# Patient Record
Sex: FEMALE | Race: WHITE | HISPANIC OR LATINO | URBAN - METROPOLITAN AREA
[De-identification: names, ages, dates, MRNs, and addresses within clinical notes are randomized per-mention and may not be internally consistent; named-entity substitution may affect disease eponyms.]

---

## 2023-10-06 ENCOUNTER — APPOINTMENT (OUTPATIENT)
Dept: LAB | Age: 38
End: 2023-10-06
Payer: COMMERCIAL

## 2023-10-06 DIAGNOSIS — Z11.59 SCREENING EXAMINATION FOR POLIOMYELITIS: ICD-10-CM

## 2023-10-06 LAB — HCV AB SER QL: NORMAL

## 2023-10-06 PROCEDURE — 86803 HEPATITIS C AB TEST: CPT

## 2023-10-06 PROCEDURE — 36415 COLL VENOUS BLD VENIPUNCTURE: CPT

## 2024-02-27 ENCOUNTER — ANNUAL EXAM (OUTPATIENT)
Dept: OBGYN CLINIC | Facility: CLINIC | Age: 39
End: 2024-02-27
Payer: COMMERCIAL

## 2024-02-27 VITALS
HEIGHT: 66 IN | BODY MASS INDEX: 29.89 KG/M2 | DIASTOLIC BLOOD PRESSURE: 72 MMHG | SYSTOLIC BLOOD PRESSURE: 110 MMHG | WEIGHT: 186 LBS

## 2024-02-27 DIAGNOSIS — N92.0 MENORRHAGIA WITH REGULAR CYCLE: Primary | ICD-10-CM

## 2024-02-27 DIAGNOSIS — Z01.419 WOMEN'S ANNUAL ROUTINE GYNECOLOGICAL EXAMINATION: ICD-10-CM

## 2024-02-27 PROCEDURE — G0476 HPV COMBO ASSAY CA SCREEN: HCPCS | Performed by: OBSTETRICS & GYNECOLOGY

## 2024-02-27 PROCEDURE — G0145 SCR C/V CYTO,THINLAYER,RESCR: HCPCS | Performed by: OBSTETRICS & GYNECOLOGY

## 2024-02-27 PROCEDURE — S0610 ANNUAL GYNECOLOGICAL EXAMINA: HCPCS | Performed by: OBSTETRICS & GYNECOLOGY

## 2024-02-27 RX ORDER — TRANEXAMIC ACID 650 MG/1
TABLET ORAL
Qty: 30 TABLET | Refills: 3 | Status: SHIPPED | OUTPATIENT
Start: 2024-02-27

## 2024-02-27 RX ORDER — METHYLPHENIDATE HYDROCHLORIDE 36 MG/1
TABLET, EXTENDED RELEASE ORAL
COMMUNITY
Start: 2024-02-06

## 2024-02-27 RX ORDER — TRAZODONE HYDROCHLORIDE 50 MG/1
25 TABLET ORAL
COMMUNITY
Start: 2024-01-02 | End: 2024-06-30

## 2024-02-27 NOTE — PROGRESS NOTES
Assessment/Plan:    Patient was informed of a stable GYN examination.  We had a discussion about the menorrhagia with regular cycles.  Her present time she would like to try the TXA/Lysteda.  Prescription was sent.  She will take 2 tablets 3 times a day for total of 5 days beginning on the first day of the menstrual cycle.  She will return to my office in 10 weeks for reevaluation she will keep a menstrual calendar.  She is aware the first cycle might not be much improved.  She is content with her weight.  She feels safe at home.  She sees a dentist on a regular basis.  Denies any prior depression or anxiety does have a history of ADHD which is under control with Concerta.  She should return to my office in 10 weeks.          Subjective:      Patient ID: Natalya Mercado is a 38 y.o. female.    HPI    This is a 38-year-old female, she is a  1 para 1  section approximately 8 years ago.  Her current method of contraception includes withdrawal and rhythm.  We had a discussion about other methods of contraception including vasectomy.  Her menstrual cycles are regular and predictable.  Her bleeding is very heavy and passing of clots.  Her bleeding pattern approximately 5 days.  She states her bleeding pattern is sometimes interfering with her lifestyle.  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  There is no problem with intimacy.  She feels safe at home.  She sees a dentist on a regular basis.  She is content with her weight.  Denies any prior depression or anxiety.  She does have a history of ADHD is on Concerta.  We had a discussion about how to control her heavy menstrual cycles including birth control pills, IUD, or TXA/list that.  She has decided to go with the third option at the present time.  There are no absolute contraindications.  Family history is noncontributory her mother is alive her father is .  The patient's  section 8 years ago was complicated by  "preeclampsia.  She is sure she is not considering any more pregnancies.  She will need a Pap smear today.  She is not a smoker.        The following portions of the patient's history were reviewed and updated as appropriate: allergies, current medications, past family history, past medical history, past social history, past surgical history, and problem list.    Review of Systems   Genitourinary:         Menorrhagia with regular cycles   All other systems reviewed and are negative.        Objective:      /72   Ht 5' 6\" (1.676 m)   Wt 84.4 kg (186 lb)   LMP 2024 (Exact Date)   BMI 30.02 kg/m²          Physical Exam  Vitals reviewed. Exam conducted with a chaperone present.   Constitutional:       Appearance: Normal appearance. She is normal weight.   HENT:      Head: Normocephalic and atraumatic.      Nose: Nose normal.   Eyes:      Extraocular Movements: Extraocular movements intact.      Pupils: Pupils are equal, round, and reactive to light.   Cardiovascular:      Rate and Rhythm: Normal rate and regular rhythm.   Pulmonary:      Effort: Pulmonary effort is normal.      Breath sounds: Normal breath sounds.   Chest:   Breasts:     Breasts are symmetrical.      Right: Normal.      Left: Normal.   Abdominal:      General: Abdomen is flat. A surgical scar is present. Bowel sounds are normal.      Palpations: Abdomen is soft. There is no hepatomegaly or splenomegaly.      Hernia: No hernia is present.          Comments: Pfannenstiel skin incision for  well-healed x 1   Genitourinary:     General: Normal vulva.      Pubic Area: No rash or pubic lice.       Labia:         Right: No rash, tenderness, lesion or injury.         Left: No rash, tenderness, lesion or injury.       Urethra: No prolapse, urethral pain, urethral swelling or urethral lesion.      Vagina: Normal. No signs of injury and foreign body. No vaginal discharge, erythema, tenderness, bleeding, lesions or prolapsed vaginal walls.    "   Cervix: Normal.      Uterus: Normal.       Adnexa: Right adnexa normal and left adnexa normal.      Rectum: Normal.      Comments: The external genitalia within normal limits the vagina is clean the uterus is retroverted normal size.  There is no cervical motion tenderness.  A Pap smear was performed.  There is no evidence of prolapse.  The adnexa is clear bilaterally.  Musculoskeletal:         General: Normal range of motion.      Cervical back: Normal range of motion and neck supple.   Lymphadenopathy:      Upper Body:      Right upper body: No supraclavicular adenopathy.      Left upper body: No supraclavicular adenopathy.   Skin:     General: Skin is warm and dry.   Neurological:      General: No focal deficit present.      Mental Status: She is alert and oriented to person, place, and time.   Psychiatric:         Mood and Affect: Mood normal.         Behavior: Behavior normal.

## 2024-02-27 NOTE — PATIENT INSTRUCTIONS
The patient was informed of a stable GYN examination.  The uterus is retroverted but normal size.  We had a discussion about menorrhagia and irregular cycles.  We had a discussion about using birth control pills, IUD or medication.  She is prefers to try the medication called TXA/Lysteda.  A prescription was sent.  She will take 2 tablets 3 times a day beginning on the first day of the menstrual cycle for total of 5 days.  She should return to my office in 10 weeks for reevaluation she will keep a menstrual calendar.

## 2024-02-28 LAB
HPV HR 12 DNA CVX QL NAA+PROBE: NEGATIVE
HPV16 DNA CVX QL NAA+PROBE: NEGATIVE
HPV18 DNA CVX QL NAA+PROBE: NEGATIVE

## 2024-03-04 LAB
LAB AP GYN PRIMARY INTERPRETATION: NORMAL
Lab: NORMAL

## 2024-04-16 ENCOUNTER — NURSE TRIAGE (OUTPATIENT)
Age: 39
End: 2024-04-16

## 2024-04-16 NOTE — TELEPHONE ENCOUNTER
Regarding: pos pregnancy test unsure lmp  ----- Message from Morris Thompson sent at 4/16/2024  2:24 PM EDT -----  Patient took positive pregnancy test today lmp 3/6 but only lasted 2 days. Last intimate with  in February that period was 2/7. Patient seen by Dr. Toth is aware he does not do OB she was going to look into which office she wanted to be scheduled in.

## 2024-04-16 NOTE — TELEPHONE ENCOUNTER
"Incoming call from patient to schedule appointment with provider. Patient states last true LMP 2/7/24. Patient states she had 2 days of bleeding 3/6/24, but doesn't believe that was her period. States she was taking TXA so she is unsure. Positive home pregnancy test today. Scheduled for earliest D/V appointment 5/6. Advised will reach ou to office staff to see if they would like patient to be seen sooner vs if they have any available earlier. Patient verbalized understanding. No further questions at this time.     Reason for Disposition   Information only question and nurse able to answer    Answer Assessment - Initial Assessment Questions  1. REASON FOR CALL or QUESTION: \"What is your reason for calling today?\" or \"How can I best help you?\" or \"What question do you have that I can help answer?\"      Schedule D/V appointment    Protocols used: Information Only Call - No Triage-ADULT-[place]    "

## 2024-04-19 NOTE — TELEPHONE ENCOUNTER
Called Pt to offer sooner apt 4/25. Pt declined due that she has to work, nothing else available sooner will keep looking for cancellations. Pt only available after 2pm, or Tuesdays and Fridays.

## 2024-04-23 ENCOUNTER — TELEPHONE (OUTPATIENT)
Facility: HOSPITAL | Age: 39
End: 2024-04-23

## 2024-04-23 ENCOUNTER — ULTRASOUND (OUTPATIENT)
Dept: OBGYN CLINIC | Facility: CLINIC | Age: 39
End: 2024-04-23
Payer: COMMERCIAL

## 2024-04-23 VITALS
WEIGHT: 167 LBS | SYSTOLIC BLOOD PRESSURE: 106 MMHG | HEIGHT: 66 IN | BODY MASS INDEX: 26.84 KG/M2 | DIASTOLIC BLOOD PRESSURE: 68 MMHG

## 2024-04-23 DIAGNOSIS — N91.2 AMENORRHEA: Primary | ICD-10-CM

## 2024-04-23 DIAGNOSIS — Z34.90 EARLY STAGE OF PREGNANCY: ICD-10-CM

## 2024-04-23 PROCEDURE — 76817 TRANSVAGINAL US OBSTETRIC: CPT | Performed by: PHYSICIAN ASSISTANT

## 2024-04-23 PROCEDURE — 99213 OFFICE O/P EST LOW 20 MIN: CPT | Performed by: PHYSICIAN ASSISTANT

## 2024-04-23 NOTE — TELEPHONE ENCOUNTER
Called patient to schedule MFM appointment, based on referral issued to Maternal Fetal Medicine by OB office.    Left voicemail requesting patient to call back and schedule appointment, with office number for return call [phone].

## 2024-04-23 NOTE — PROGRESS NOTES
"S: 38 y.o.  who presents for viability scan with LMP of 24. She is 10 weeks and 4 days by her LMP. She endorses some vaginal spotting last month in Feb when she was due  for her period w no recurrence since. This is not a planned and welcomed pregnancy. Her previous pregnancies was complicated by preE. Previously delivered via  due to arrest of dilation.      History reviewed. No pertinent past medical history.    OB History    Para Term  AB Living   2 1 1     1   SAB IAB Ectopic Multiple Live Births           1      # Outcome Date GA Lbr Julio/2nd Weight Sex Delivery Anes PTL Lv   2 Current            1 Term 16 39w4d  3275 g (7 lb 3.5 oz) F CS-LTranv EPI N BAUDILIO      Complications: Failure to Progress in Second Stage, Severe pre-eclampsia        O:  Vitals:    24 0954   BP: 106/68   BP Location: Left arm   Patient Position: Sitting   Cuff Size: Standard   Weight: 75.8 kg (167 lb)   Height: 5' 6\" (1.676 m)       TVUS: viable, roca IUP at 7 weeks 5 days with CRL 14mm. FHT 168bpm. CLARIBEL 24. Final dating via US. S<D by approx 3 weeks will adjust CLARIBEL by US.                      A/P:  #1. IUP at 7 weeks and 5 days  - Viable pregnancy on TVUS  - RTC in 1 week for nurse intake visit  -Slip printed for initial labs including carrier screenings and preE baseline labs. She will plan HNL for her blood work    Problem List Items Addressed This Visit    None  Visit Diagnoses       Amenorrhea    -  Primary    Relevant Orders    [facility] US OB < 14 weeks single or first gestation level 1    Early stage of pregnancy        Relevant Orders    Ambulatory Referral to Maternal Fetal Medicine    CBC and differential    Hepatitis B surface antigen    Hepatitis C antibody    Hgb Fractionation Cascade    HIV 1/2 AG/AB w Reflex [facility] for 2 yr old and above    Rubella antibody, IgG    RPR-Syphilis Screening (Total Syphilis IGG/IGM)    Type and screen    Urinalysis with microscopic    Urine " culture    Varicella zoster antibody, IgG    Spinal muscular atrophy DNA    Cystic fibrosis gene test    Protein / creatinine ratio, urine    Comprehensive metabolic panel            Valentine Vaughan PA-C  OB/GYN  4/23/2024  10:39 AM

## 2024-04-25 ENCOUNTER — TELEPHONE (OUTPATIENT)
Age: 39
End: 2024-04-25

## 2024-04-26 ENCOUNTER — APPOINTMENT (OUTPATIENT)
Dept: LAB | Facility: HOSPITAL | Age: 39
End: 2024-04-26
Payer: COMMERCIAL

## 2024-04-26 DIAGNOSIS — Z34.90 EARLY STAGE OF PREGNANCY: ICD-10-CM

## 2024-04-26 LAB
ABO GROUP BLD: NORMAL
ALBUMIN SERPL BCP-MCNC: 4.4 G/DL (ref 3.5–5)
ALP SERPL-CCNC: 36 U/L (ref 34–104)
ALT SERPL W P-5'-P-CCNC: 13 U/L (ref 7–52)
ANION GAP SERPL CALCULATED.3IONS-SCNC: 9 MMOL/L (ref 4–13)
AST SERPL W P-5'-P-CCNC: 15 U/L (ref 13–39)
BACTERIA UR QL AUTO: NORMAL /HPF
BASOPHILS # BLD AUTO: 0.03 THOUSANDS/ÂΜL (ref 0–0.1)
BASOPHILS NFR BLD AUTO: 1 % (ref 0–1)
BILIRUB SERPL-MCNC: 0.46 MG/DL (ref 0.2–1)
BILIRUB UR QL STRIP: NEGATIVE
BLD GP AB SCN SERPL QL: NEGATIVE
BUN SERPL-MCNC: 9 MG/DL (ref 5–25)
CALCIUM SERPL-MCNC: 9.4 MG/DL (ref 8.4–10.2)
CHLORIDE SERPL-SCNC: 102 MMOL/L (ref 96–108)
CLARITY UR: CLEAR
CO2 SERPL-SCNC: 25 MMOL/L (ref 21–32)
COLOR UR: YELLOW
CREAT SERPL-MCNC: 0.52 MG/DL (ref 0.6–1.3)
CREAT UR-MCNC: 107.1 MG/DL
EOSINOPHIL # BLD AUTO: 0.05 THOUSAND/ÂΜL (ref 0–0.61)
EOSINOPHIL NFR BLD AUTO: 1 % (ref 0–6)
ERYTHROCYTE [DISTWIDTH] IN BLOOD BY AUTOMATED COUNT: 12.9 % (ref 11.6–15.1)
GFR SERPL CREATININE-BSD FRML MDRD: 122 ML/MIN/1.73SQ M
GLUCOSE P FAST SERPL-MCNC: 88 MG/DL (ref 65–99)
GLUCOSE UR STRIP-MCNC: NEGATIVE MG/DL
HBV SURFACE AG SER QL: NORMAL
HCT VFR BLD AUTO: 34.8 % (ref 34.8–46.1)
HCV AB SER QL: NORMAL
HGB BLD-MCNC: 11.5 G/DL (ref 11.5–15.4)
HGB UR QL STRIP.AUTO: NEGATIVE
HIV 1+2 AB+HIV1 P24 AG SERPL QL IA: NORMAL
HIV 2 AB SERPL QL IA: NORMAL
HIV1 AB SERPL QL IA: NORMAL
HIV1 P24 AG SERPL QL IA: NORMAL
IMM GRANULOCYTES # BLD AUTO: 0.01 THOUSAND/UL (ref 0–0.2)
IMM GRANULOCYTES NFR BLD AUTO: 0 % (ref 0–2)
KETONES UR STRIP-MCNC: NEGATIVE MG/DL
LEUKOCYTE ESTERASE UR QL STRIP: NEGATIVE
LYMPHOCYTES # BLD AUTO: 1.67 THOUSANDS/ÂΜL (ref 0.6–4.47)
LYMPHOCYTES NFR BLD AUTO: 31 % (ref 14–44)
MCH RBC QN AUTO: 30.1 PG (ref 26.8–34.3)
MCHC RBC AUTO-ENTMCNC: 33 G/DL (ref 31.4–37.4)
MCV RBC AUTO: 91 FL (ref 82–98)
MONOCYTES # BLD AUTO: 0.4 THOUSAND/ÂΜL (ref 0.17–1.22)
MONOCYTES NFR BLD AUTO: 7 % (ref 4–12)
NEUTROPHILS # BLD AUTO: 3.28 THOUSANDS/ÂΜL (ref 1.85–7.62)
NEUTS SEG NFR BLD AUTO: 60 % (ref 43–75)
NITRITE UR QL STRIP: NEGATIVE
NON-SQ EPI CELLS URNS QL MICRO: NORMAL /HPF
NRBC BLD AUTO-RTO: 0 /100 WBCS
PH UR STRIP.AUTO: 7.5 [PH]
PLATELET # BLD AUTO: 277 THOUSANDS/UL (ref 149–390)
PMV BLD AUTO: 10.5 FL (ref 8.9–12.7)
POTASSIUM SERPL-SCNC: 3.6 MMOL/L (ref 3.5–5.3)
PROT SERPL-MCNC: 7.2 G/DL (ref 6.4–8.4)
PROT UR STRIP-MCNC: NEGATIVE MG/DL
PROT UR-MCNC: 7 MG/DL
PROT/CREAT UR: 0.07 MG/G{CREAT} (ref 0–0.1)
RBC # BLD AUTO: 3.82 MILLION/UL (ref 3.81–5.12)
RBC #/AREA URNS AUTO: NORMAL /HPF
RH BLD: POSITIVE
RUBV IGG SERPL IA-ACNC: 33.8 IU/ML
SODIUM SERPL-SCNC: 136 MMOL/L (ref 135–147)
SP GR UR STRIP.AUTO: 1.01 (ref 1–1.03)
SPECIMEN EXPIRATION DATE: NORMAL
TREPONEMA PALLIDUM IGG+IGM AB [PRESENCE] IN SERUM OR PLASMA BY IMMUNOASSAY: NORMAL
UROBILINOGEN UR QL STRIP.AUTO: 0.2 E.U./DL
VZV IGG SER QL IA: ABNORMAL
WBC # BLD AUTO: 5.44 THOUSAND/UL (ref 4.31–10.16)
WBC #/AREA URNS AUTO: NORMAL /HPF

## 2024-04-26 PROCEDURE — 81220 CFTR GENE COM VARIANTS: CPT

## 2024-04-26 PROCEDURE — 86762 RUBELLA ANTIBODY: CPT

## 2024-04-26 PROCEDURE — 86850 RBC ANTIBODY SCREEN: CPT

## 2024-04-26 PROCEDURE — 87389 HIV-1 AG W/HIV-1&-2 AB AG IA: CPT

## 2024-04-26 PROCEDURE — 83020 HEMOGLOBIN ELECTROPHORESIS: CPT

## 2024-04-26 PROCEDURE — 84156 ASSAY OF PROTEIN URINE: CPT

## 2024-04-26 PROCEDURE — 87340 HEPATITIS B SURFACE AG IA: CPT

## 2024-04-26 PROCEDURE — 85025 COMPLETE CBC W/AUTO DIFF WBC: CPT

## 2024-04-26 PROCEDURE — 86901 BLOOD TYPING SEROLOGIC RH(D): CPT

## 2024-04-26 PROCEDURE — 86803 HEPATITIS C AB TEST: CPT

## 2024-04-26 PROCEDURE — 81001 URINALYSIS AUTO W/SCOPE: CPT

## 2024-04-26 PROCEDURE — 81329 SMN1 GENE DOS/DELETION ALYS: CPT

## 2024-04-26 PROCEDURE — 36415 COLL VENOUS BLD VENIPUNCTURE: CPT

## 2024-04-26 PROCEDURE — 87086 URINE CULTURE/COLONY COUNT: CPT

## 2024-04-26 PROCEDURE — 80053 COMPREHEN METABOLIC PANEL: CPT

## 2024-04-26 PROCEDURE — 86780 TREPONEMA PALLIDUM: CPT

## 2024-04-26 PROCEDURE — 86900 BLOOD TYPING SEROLOGIC ABO: CPT

## 2024-04-26 PROCEDURE — 86787 VARICELLA-ZOSTER ANTIBODY: CPT

## 2024-04-26 PROCEDURE — 82570 ASSAY OF URINE CREATININE: CPT

## 2024-04-27 LAB — BACTERIA UR CULT: NORMAL

## 2024-04-30 ENCOUNTER — INITIAL PRENATAL (OUTPATIENT)
Dept: OBGYN CLINIC | Facility: CLINIC | Age: 39
End: 2024-04-30

## 2024-04-30 VITALS
BODY MASS INDEX: 27 KG/M2 | DIASTOLIC BLOOD PRESSURE: 70 MMHG | HEIGHT: 66 IN | WEIGHT: 168 LBS | SYSTOLIC BLOOD PRESSURE: 110 MMHG

## 2024-04-30 DIAGNOSIS — Z34.82 ENCOUNTER FOR SUPERVISION OF OTHER NORMAL PREGNANCY, SECOND TRIMESTER: Primary | ICD-10-CM

## 2024-04-30 LAB
HGB A MFR BLD: 2.7 % (ref 1.8–3.2)
HGB A MFR BLD: 97.3 % (ref 96.4–98.8)
HGB F MFR BLD: 0 % (ref 0–2)
HGB FRACT BLD-IMP: NORMAL
HGB S MFR BLD: 0 %

## 2024-04-30 PROCEDURE — OBC

## 2024-04-30 RX ORDER — ASPIRIN 81 MG/1
162 TABLET, CHEWABLE ORAL DAILY
COMMUNITY

## 2024-04-30 NOTE — PROGRESS NOTES
.  OB INTAKE INTERVIEW  Patient is 38 y.o.y.o. who presents for OB intake at 8wks 5 Days  She is accompanied by herself during this encounter  The father of her baby (Jesús) is involved in the pregnancy and is 41 years old.      Last Menstrual Period: 2024  Ultrasound: Measured 7 weeks 5 days on 2024  Estimated Date of Delivery: 2024  confirmed 7.5 week US    Signs/Symptoms of Pregnancy  Current pregnancy symptoms: Fatigue  Constipation no  Headaches YES  Cramping/spotting no  PICA cravings no    Diabetes-  Body mass index is 27.12 kg/m².  If patient has 1 or more, please order early 1 hour GTT  History of GDM no  BMI >35 no  History of PCOS or current metformin use no  History of LGA/macrosomic infant (4000g/9lbs) no    If patient has 2 or more, please order early 1 hour GTT  BMI>30 no  AMA YES  First degree relative with type 2 diabetes no  History of chronic HTN, hyperlipidemia, elevated A1C no  High risk race (, , ,  or ) no    Hypertension- if you answer yes to any of the following, please order baseline preeclampsia labs (cbc, comprehensive metabolic panel, urine protein creatinine ratio, uric acid)  History of of chronic HTN no  History of gestational HTN no  History of preeclampsia, eclampsia, or HELLP syndrome YES  History of diabetes no  History of lupus, autoimmune disease, kidney disease no    Thyroid- if yes order TSH with reflex T4  History of thyroid disease no    Bleeding Disorder or Hx of DVT-patient or first degree relative with history of. Order the following if not done previously.   (Factor V, antithrombin III, prothrombin gene mutation, protein C and S Ag, lupus anticoagulant, anticardiolipin, beta-2 glycoprotein)   no    OB/GYN-  History of abnormal pap smear YES       Date of last pap smear 2024  History of HPV no  History of Herpes/HSV no  History of other STI (gonorrhea, chlamydia, trich) no  History of  prior  no  History of prior  YES  History of  delivery prior to 36 weeks 6 days no  History of blood transfusion no  Ok for blood transfusion  yes    Substance screening-   History of tobacco use no  Currently using tobacco no  Substance Use Screen Level (N/A, LOW, HIGH)  Low    MRSA Screening-   Does the pt have a hx of MRSA? no    Immunizations:  Influenza vaccine given this season  yes  Discussed Tdap vaccine yes  Discussed COVID Vaccine yes    Genetic/[facility]-  Do you or your partner have a history of any of the following in yourselves or first degree relatives?  Cystic fibrosis no  Spinal muscular atrophy no  Hemoglobinopathy/Sickle Cell/Thalassemia no  Fragile X Intellectual Disability no        If no, discuss Carrier Screening being completed once in a lifetime as a standard of care lab test. Place orders for Cystic Fibrosis Gene Test (MBO225) and Spinal Muscular Atrophy DNA ([id number])      Appointment for Nuchal Translucency Ultrasound at [facility] scheduled for 2024      Interview education  [facility] Pregnancy Essentials Book reviewed, discussed and attached to their AVS  yes    Nurse/Family Partnership- patient may qualify  No referral placed  No    Prenatal lab work scripts  yes was already ordered and completed, PIH  labs also completed except uric acid   Extra labs ordered: None at this time       Aspirin/Preeclampsia Screen    Risk Level Risk Factor Recommendation   LOW Prior Uncomplicated full-term delivery YES No Aspirin recommendation        MODERATE Nulliparity no Recommend low-dose aspirin if     BMI>30 no 2 or more moderate risk factors    Family History Preeclampsia (mother/sister) YES ,patient mother     35yr old or greater YES      or Low Socioeconomic no     IVF Pregnancy  no     Personal History Risks (low birth weight, prior adverse preg outcome, >10yr preg interval) no         HIGH History of Preeclampsia YES Recommend low-dose aspirin if     Multifetal  gestation no 1 or more high risk factors    Chronic HTN no     Type 1 or 2 Diabetes no     Renal Disease no     Autoimmune Disease  no      Contraindications to ASA therapy:  NSAID/ ASA allergy: no  Nasal polyps: no  Asthma with history of ASA induced bronchospasm: no  Relative contraindications:  History of GI bleed: no  Active peptic ulcer disease: no  Severe hepatic dysfunction: no    Patient should be recommended to take ASA 162mg during this pregnancy from 12-36wks to lower her risk of preeclampsia:  yes was started by NIMA Villasenor      The patient has a history now or in prior pregnancy notable for: Pre Eclampsia in 3rd trimester        Details that I feel the provider should be aware of:  AMA, Last child was 7 years ago, Hx Preeclampsia     PN1 visit scheduled. The patient was oriented to our practice, the navigator role, reviewed delivering physicians and [facility] for Delivery. All questions were answered.    Interviewed by: Jillian Escobar LPN, OB Nurse Navigator

## 2024-05-01 DIAGNOSIS — Z00.6 ENCOUNTER FOR EXAMINATION FOR NORMAL COMPARISON OR CONTROL IN CLINICAL RESEARCH PROGRAM: ICD-10-CM

## 2024-05-02 ENCOUNTER — OB ABSTRACT (OUTPATIENT)
Dept: OBGYN CLINIC | Facility: CLINIC | Age: 39
End: 2024-05-02

## 2024-05-02 LAB
CITATION REF LAB TEST: NORMAL
CLINICAL INFO: NORMAL
ETHNIC BACKGROUND STATED: NORMAL
GENE DIS ANL CARRIER INTERP-IMP: NORMAL
GENE MUT TESTED BLD/T: NORMAL
LAB DIRECTOR NAME PROVIDER: NORMAL
REASON FOR REFERRAL (NARRATIVE): NORMAL
RECOMMENDATION PATIENT DOC-IMP: NORMAL
REF LAB TEST METHOD: NORMAL
SERVICE CMNT-IMP: NORMAL
SMN1 GENE MUT ANL BLD/T: NORMAL
SPECIMEN SOURCE: NORMAL

## 2024-05-08 ENCOUNTER — ROUTINE PRENATAL (OUTPATIENT)
Facility: HOSPITAL | Age: 39
End: 2024-05-08
Payer: COMMERCIAL

## 2024-05-08 VITALS
DIASTOLIC BLOOD PRESSURE: 80 MMHG | HEIGHT: 66 IN | HEART RATE: 75 BPM | BODY MASS INDEX: 26.93 KG/M2 | WEIGHT: 167.55 LBS | SYSTOLIC BLOOD PRESSURE: 120 MMHG

## 2024-05-08 DIAGNOSIS — O09.291 HX OF PREECLAMPSIA, PRIOR PREGNANCY, CURRENTLY PREGNANT, FIRST TRIMESTER: ICD-10-CM

## 2024-05-08 DIAGNOSIS — Z3A.09 9 WEEKS GESTATION OF PREGNANCY: ICD-10-CM

## 2024-05-08 DIAGNOSIS — Z34.90 EARLY STAGE OF PREGNANCY: ICD-10-CM

## 2024-05-08 DIAGNOSIS — E55.9 VITAMIN D DEFICIENCY: ICD-10-CM

## 2024-05-08 DIAGNOSIS — O09.521 SUPERVISION OF ELDERLY MULTIGRAVIDA, FIRST TRIMESTER: Primary | ICD-10-CM

## 2024-05-08 PROBLEM — F90.0 ATTENTION DEFICIT HYPERACTIVITY DISORDER (ADHD), PREDOMINANTLY INATTENTIVE TYPE: Status: ACTIVE | Noted: 2021-05-17

## 2024-05-08 PROBLEM — F41.9 ANXIETY: Status: ACTIVE | Noted: 2021-04-29

## 2024-05-08 PROCEDURE — 76801 OB US < 14 WKS SINGLE FETUS: CPT | Performed by: OBSTETRICS & GYNECOLOGY

## 2024-05-08 PROCEDURE — 99244 OFF/OP CNSLTJ NEW/EST MOD 40: CPT | Performed by: OBSTETRICS & GYNECOLOGY

## 2024-05-08 NOTE — LETTER
May 8, 2024     Valentine Vaughan PA-C  [address] [place] [id number]    Patient: Natalya Mercado   YOB: 1985   Date of Visit: 2024       Dear Dr. Vaughan:    Thank you for referring Natalya Mercado to me for evaluation. Below are my notes for this consultation.    If you have questions, please do not hesitate to call me. I look forward to following your patient along with you.         Sincerely,        Elsa Dhaliwal MD        CC: No Recipients    Juliana Reyes  2024  2:02 PM  Sign when Signing Visit  Patient chose to have [facility] [id number] Non-Invasive Prenatal Screen [id number] PLUS w/ SCA, WITH fetal sex.  Patient choose to be billed through insurance.     Patient given brochure and is aware [facility] will contact patient's insurance and coordinate coverage.  Provided [facility] contact information. General inquiries [phone], Cost estimates [phone] and [facility] Billing [phone]. Website [email or web address] ([place]).     Blood collection tubes labeled with patient identifiers (name, medical record number, and date of birth).     Filled out [facility] order form. Patient chose to be sent to an outpatient lab to complete blood work. .      If patient chose to have blood work drawn at a [facility] lab we requested patient notify MFM (via phone call or Meet[employer] message) when blood collected so office can follow up on results.       Maternal Fetal Medicine will have results in approximately 5-7 business days and will call patient or notify via [employer].  Patient aware viewing lab result online will reveal fetal sex if ordered.    Patient verbalized understanding of all instructions and no questions at this time.     Elsa Dhaliwal MD  2024  9:08 PM  Sign when Signing Visit  OFFICE CONSULT  Referring physician:   Valentine Vaughan Pa-c  [address] [place] [address]      Dear Marilyn Vaughan      Thank you for requesting a  consultation on your  patient Ms. Natalya Rhodessch for the following indications:  Genetic screening.  She is 9 weeks and 6 days today and was scheduled based on her LMP dating which was then ultimately changed by her ultrasound dating but her appointment was not rescheduled.  She is here today with her partner Jesús.    History  Medications: Prenatal vitamins and aspirin 162 mg daily  Allergies to medications: Penicillin causes anaphylaxis and iodine that causes GI intolerance  Past medical history: Advanced maternal age, history of preeclampsia with severe features and postpartum hypertension with a prior pregnancy in 2016, depression/anxiety, vitamin d deficiency on replacement  Past surgical history:  section and tympanostomy tube placement  Past obstetrical history:   2016 at 39.5 weeks she had a 7lb 3.5 oz ounce baby girl by  section for preeclampsia with severe features and postpartum hypertension.   section was for arrest of dilation at 6 cm with a category 2 tracing.  She did not require antihypertensives immediately post partum and received prophylactic Lovenox postoperatively. 20 days post delivery on 17 she was seen in the office for bps 130-150 /90-110s and she was started on Procardia 30 xl daily for at least a month.  She reports no history of hypertension outside of pregnancy.  Social history: Denies substance use  First generation family history: Her father  from HIV.  Her mother developed preeclampsia in 2 of her pregnancies.     Ultrasound findings: The ultrasound shows a fetus concordant with CLARIBEL based on her initial scan.    The patient was informed of the findings and counseled about the limitations of the exam in detecting all forms of fetal congenital abnormalities.    She does not report any vaginal bleeding or uterine cramping or contractions.      Specific counseling was provided on the following problems:  Natalya has a 1 and 140 risk for having a baby with Down syndrome  and a 1 and 104 risk for having a baby with any chromosome problem based on her age of 39 at the time of delivery . We discussed the options for genetic screening which include invasive testing on the fetal placenta or on fetal skin cells within the amniotic fluid and compared this to noninvasive testing which includes cell free DNA screening NIPT that evaluates 5 chromosomes (21, 18, 13, X, Y ) and the extended NIPT testing called Genome which evaluates all the chromosomes.  We reviewed the risks, the benefits and the limitations of each.  In the end patient chose to complete the routine NIPT that reviews the 5 chromosomes. She will complete at the [place] lab at 10 weeks.    Advanced Maternal Age (AMA) is defined as maternal age 35 or greater at the best estimation of the due date. Advanced maternal age is associated with an increased risk of several pregnancy outcomes, including aneuploidy/genetic syndromes, poor fetal growth, stillbirth, maternal hypertensive disorders, and gestational diabetes. Despite these increased risks, many women of advanced maternal age have normal, healthy pregnancy outcomes, particularly if they have no co-existing medical conditions. Risk of adverse outcomes is proportional to patient age. Recommend a third trimester ultrasound for fetal growth at 32 weeks.   With a history of preeclampsia in a prior pregnancy at term her recurrence risk may be as high as 25%.  She is already on baby aspirin and is aware to continue this daily till 36 weeks and 0 days.  She completed normal baseline preeclamptic labs.  With her history of a prior  and that she is considering a tubal at the time of delivery, she is considering a repeat  and tubal.       Future tests recommended:  The results of her NIPT will return in 7-10 days.  Screening for spina bifida with an MSAFP screen is a future test that can be prescribed through her OB office.  This blood work should be drawn preferably at  16 to 18 weeks so that the results return prior to her next scan.  The test though can be run until 21 weeks and 6 days if needed.  VIt d level was ordered    Future ultrasounds ordered today:   Fetal Level II ultrasound imaging is recommended at 19-20 weeks' gestation.  A NT scan is planned for 13-14 weeks    Pre visit time reviewing her records   15 minutes  Face to face time 20 minutes  Post visit time on documentation of note, updating her problem list, adding orders and prescriptions 15 minutes.  Procedures that were completed today were charged separately.   The level of decision making was moderate complexity.    Elsa Dhaliwal MD

## 2024-05-08 NOTE — PROGRESS NOTES
OFFICE CONSULT  Referring physician:   Valentine Vaughan Pa-c  [address] [place] [address]      Dear Marilyn Vaughan      Thank you for requesting a  consultation on your patient Ms. Natalya Mercado for the following indications:  Genetic screening.  She is 9 weeks and 6 days today and was scheduled based on her LMP dating which was then ultimately changed by her ultrasound dating but her appointment was not rescheduled.  She is here today with her partner Jesús.    History  Medications: Prenatal vitamins and aspirin 162 mg daily  Allergies to medications: Penicillin causes anaphylaxis and iodine that causes GI intolerance  Past medical history: Advanced maternal age, history of preeclampsia with severe features and postpartum hypertension with a prior pregnancy in 2016, depression/anxiety, vitamin d deficiency on replacement  Past surgical history:  section and tympanostomy tube placement  Past obstetrical history:   2016 at 39.5 weeks she had a 7lb 3.5 oz ounce baby girl by  section for preeclampsia with severe features and postpartum hypertension.   section was for arrest of dilation at 6 cm with a category 2 tracing.  She did not require antihypertensives immediately post partum and received prophylactic Lovenox postoperatively. 20 days post delivery on 17 she was seen in the office for bps 130-150 /90-110s and she was started on Procardia 30 xl daily for at least a month.  She reports no history of hypertension outside of pregnancy.  Social history: Denies substance use  First generation family history: Her father  from HIV.  Her mother developed preeclampsia in 2 of her pregnancies.     Ultrasound findings: The ultrasound shows a fetus concordant with CLARIBEL based on her initial scan.    The patient was informed of the findings and counseled about the limitations of the exam in detecting all forms of fetal congenital abnormalities.    She does not report  any vaginal bleeding or uterine cramping or contractions.      Specific counseling was provided on the following problems:  Natalya has a 1 and 140 risk for having a baby with Down syndrome and a 1 and 104 risk for having a baby with any chromosome problem based on her age of 39 at the time of delivery . We discussed the options for genetic screening which include invasive testing on the fetal placenta or on fetal skin cells within the amniotic fluid and compared this to noninvasive testing which includes cell free DNA screening NIPT that evaluates 5 chromosomes (21, 18, 13, X, Y ) and the extended NIPT testing called Genome which evaluates all the chromosomes.  We reviewed the risks, the benefits and the limitations of each.  In the end patient chose to complete the routine NIPT that reviews the 5 chromosomes. She will complete at the [facility] lab at 10 weeks.    Advanced Maternal Age (AMA) is defined as maternal age 35 or greater at the best estimation of the due date. Advanced maternal age is associated with an increased risk of several pregnancy outcomes, including aneuploidy/genetic syndromes, poor fetal growth, stillbirth, maternal hypertensive disorders, and gestational diabetes. Despite these increased risks, many women of advanced maternal age have normal, healthy pregnancy outcomes, particularly if they have no co-existing medical conditions. Risk of adverse outcomes is proportional to patient age. Recommend a third trimester ultrasound for fetal growth at 32 weeks.   With a history of preeclampsia in a prior pregnancy at term her recurrence risk may be as high as 25%.  She is already on baby aspirin and is aware to continue this daily till 36 weeks and 0 days.  She completed normal baseline preeclamptic labs.  With her history of a prior  and that she is considering a tubal at the time of delivery, she is considering a repeat  and tubal.       Future tests recommended:  The results of her  NIPT will return in 7-10 days.  Screening for spina bifida with an MSAFP screen is a future test that can be prescribed through her OB office.  This blood work should be drawn preferably at 16 to 18 weeks so that the results return prior to her next scan.  The test though can be run until 21 weeks and 6 days if needed.  VIt d level was ordered    Future ultrasounds ordered today:   Fetal Level II ultrasound imaging is recommended at 19-20 weeks' gestation.  A NT scan is planned for 13-14 weeks    Pre visit time reviewing her records   15 minutes  Face to face time 20 minutes  Post visit time on documentation of note, updating her problem list, adding orders and prescriptions 15 minutes.  Procedures that were completed today were charged separately.   The level of decision making was moderate complexity.    Elsa Dhaliwal MD

## 2024-05-08 NOTE — PROGRESS NOTES
Patient chose to have [facility] [id number] Non-Invasive Prenatal Screen [id number] PLUS w/ SCA, WITH fetal sex.  Patient choose to be billed through insurance.     Patient given brochure and is aware [facility] will contact patient's insurance and coordinate coverage.  Provided [facility] contact information. General inquiries [phone], Cost estimates [phone] and [facility] Billing [phone]. Website [email or web address].     Blood collection tubes labeled with patient identifiers (name, medical record number, and date of birth).     Filled out [facility] order form. Patient chose to be sent to an outpatient lab to complete blood work. .      If patient chose to have blood work drawn at a [facility] lab we requested patient notify MFM (via phone call or [employer] message) when blood collected so office can follow up on results.       Maternal Fetal Medicine will have results in approximately 5-7 business days and will call patient or notify via [employer].  Patient aware viewing lab result online will reveal fetal sex if ordered.    Patient verbalized understanding of all instructions and no questions at this time.

## 2024-05-09 ENCOUNTER — LAB (OUTPATIENT)
Dept: LAB | Facility: AMBULARY SURGERY CENTER | Age: 39
End: 2024-05-09
Payer: COMMERCIAL

## 2024-05-09 DIAGNOSIS — E55.9 VITAMIN D DEFICIENCY: ICD-10-CM

## 2024-05-09 DIAGNOSIS — Z3A.09 9 WEEKS GESTATION OF PREGNANCY: ICD-10-CM

## 2024-05-09 LAB
25(OH)D3 SERPL-MCNC: 20.6 NG/ML (ref 30–100)
CFTR FULL MUT ANL BLD/T SEQ: NORMAL
CITATION REF LAB TEST: NORMAL
CLINICAL INFO: NORMAL
ETHNIC BACKGROUND STATED: NORMAL
GENE DIS ANL CARRIER INTERP-IMP: NORMAL
INDICATION: NORMAL
LAB DIRECTOR NAME PROVIDER: NORMAL
RECOMMENDATION PATIENT DOC-IMP: NORMAL
REF LAB TEST METHOD: NORMAL
SERVICE CMNT-IMP: NORMAL
SPECIMEN SOURCE: NORMAL

## 2024-05-09 PROCEDURE — 36415 COLL VENOUS BLD VENIPUNCTURE: CPT

## 2024-05-09 PROCEDURE — 82306 VITAMIN D 25 HYDROXY: CPT

## 2024-05-10 ENCOUNTER — APPOINTMENT (OUTPATIENT)
Dept: LAB | Facility: HOSPITAL | Age: 39
End: 2024-05-10

## 2024-05-10 DIAGNOSIS — Z00.6 ENCOUNTER FOR EXAMINATION FOR NORMAL COMPARISON OR CONTROL IN CLINICAL RESEARCH PROGRAM: ICD-10-CM

## 2024-05-10 PROBLEM — E55.9 VITAMIN D DEFICIENCY: Status: ACTIVE | Noted: 2024-05-10

## 2024-05-10 PROCEDURE — 36415 COLL VENOUS BLD VENIPUNCTURE: CPT

## 2024-05-10 NOTE — RESULT ENCOUNTER NOTE
Natalya Mercado   Your labs results below returned as abnormal.     Your vitamin D level is 20.6.  At your appointment we discussed that you are not taking your vitamin D currently.  With this level being less than 30 I would recommend that you are on at least 1000 to 2000 units of vitamin D daily and your OB provider can then retest your level with your 16-week lab work that they ordered to screen for spina bifida.    Elsa Dhaliwal MD

## 2024-05-13 LAB
CFDNA.FET/CFDNA.TOTAL SFR FETUS: NORMAL %
CITATION REF LAB TEST: NORMAL
FET 13+18+21+X+Y ANEUP PLAS.CFDNA: NEGATIVE
FET CHR 21 TS PLAS.CFDNA QL: NEGATIVE
FET CHR 21 TS PLAS.CFDNA QL: NEGATIVE
FET MS X RISK WBC.DNA+CFDNA QL: NOT DETECTED
FET SEX PLAS.CFDNA DOSAGE CFDNA: NORMAL
FET TS 13 RISK PLAS.CFDNA QL: NEGATIVE
FET X + Y ANEUP RISK PLAS.CFDNA SEQ-IMP: NOT DETECTED
GA EST FROM CONCEPTION DATE: NORMAL D
GESTATIONAL AGE > 9:: YES
LAB DIRECTOR NAME PROVIDER: NORMAL
LAB DIRECTOR NAME PROVIDER: NORMAL
LABORATORY COMMENT REPORT: NORMAL
LIMITATIONS OF THE TEST: NORMAL
NEGATIVE PREDICTIVE VALUE: NORMAL
PERFORMANCE CHARACTERISTICS: NORMAL
POSITIVE PREDICTIVE VALUE: NORMAL
REF LAB TEST METHOD: NORMAL
SL AMB NOTE:: NORMAL
TEST PERFORMANCE INFO SPEC: NORMAL

## 2024-05-14 NOTE — RESULT ENCOUNTER NOTE
Ms. Natalya Rhodessch   Your Cell free DNA screening returned as normal.  If you are interested in knowing what the baby's sex is, than you will need to open the lab result to see it.     Your obstetrician at your next OB visit should offer screening for spina bifida that can be completed between 16 and 18 weeks and utilizes the blood test called MSAFP. This lab is to see if your baby is at increased risk to have spinal defect.    Elsa Dhaliwal MD

## 2024-05-16 ENCOUNTER — OFFICE VISIT (OUTPATIENT)
Dept: URGENT CARE | Facility: CLINIC | Age: 39
End: 2024-05-16
Payer: COMMERCIAL

## 2024-05-16 VITALS
HEIGHT: 66 IN | HEART RATE: 88 BPM | SYSTOLIC BLOOD PRESSURE: 96 MMHG | BODY MASS INDEX: 25.71 KG/M2 | OXYGEN SATURATION: 96 % | WEIGHT: 160 LBS | TEMPERATURE: 98.4 F | DIASTOLIC BLOOD PRESSURE: 62 MMHG

## 2024-05-16 DIAGNOSIS — J06.9 ACUTE URI: Primary | ICD-10-CM

## 2024-05-16 LAB — S PYO AG THROAT QL: NEGATIVE

## 2024-05-16 PROCEDURE — 87880 STREP A ASSAY W/OPTIC: CPT | Performed by: NURSE PRACTITIONER

## 2024-05-16 PROCEDURE — 99213 OFFICE O/P EST LOW 20 MIN: CPT | Performed by: NURSE PRACTITIONER

## 2024-05-16 NOTE — PATIENT INSTRUCTIONS
Rapid strep: negative  Tylenol/Motrin as needed for pain/fever   Increase fluid intake   Throat lozenges, honey, salt water gargles for throat discomfort   Follow up with your PCP for worsening or concerning symptoms    Upper Respiratory Infection   WHAT YOU NEED TO KNOW:   An upper respiratory infection is also called a cold. It can affect your nose, throat, ears, and sinuses. Cold symptoms are usually worst for the first 3 to 5 days. Most people get better in 7 to 14 days. You may continue to cough for 2 to 3 weeks. Colds are caused by viruses and do not get better with antibiotics.  DISCHARGE INSTRUCTIONS:   Call your local emergency number (911 in the ) if:   You have chest pain or trouble breathing.      Return to the emergency department if:   You have a fever over 102ºF (39ºC).      Call your doctor if:   You have a low fever.    Your sore throat gets worse or you see white or yellow spots in your throat.    Your symptoms get worse after 3 to 5 days or are not better in 14 days.    You have a rash anywhere on your skin.    You have large, tender lumps in your neck.    You have thick, green, or yellow drainage from your nose.    You cough up thick yellow, green, or bloody mucus.    You have a bad earache.    You have questions or concerns about your condition or care.    Medicines:  You may need any of the following:  Decongestants  help reduce nasal congestion and help you breathe more easily. If you take decongestant pills, they may make you feel restless or cause problems with your sleep. Do not use decongestant sprays for more than a few days.    Cough suppressants  help reduce coughing. Ask your healthcare provider which type of cough medicine is best for you.     NSAIDs , such as ibuprofen, help decrease swelling, pain, and fever. NSAIDs can cause stomach bleeding or kidney problems in certain people. If you take blood thinner medicine, always ask your healthcare provider if NSAIDs are safe for you.  Always read the medicine label and follow directions.    Acetaminophen  decreases pain and fever. It is available without a doctor's order. Ask how much to take and how often to take it. Follow directions. Read the labels of all other medicines you are using to see if they also contain acetaminophen, or ask your doctor or pharmacist. Acetaminophen can cause liver damage if not taken correctly.    Take your medicine as directed.  Contact your healthcare provider if you think your medicine is not helping or if you have side effects. Tell your provider if you are allergic to any medicine. Keep a list of the medicines, vitamins, and herbs you take. Include the amounts, and when and why you take them. Bring the list or the pill bottles to follow-up visits. Carry your medicine list with you in case of an emergency.    Self-care:   Rest as much as possible.  Slowly start to do more each day.    Drink more liquids as directed.  Liquids will help thin and loosen mucus so you can cough it up. Liquids will also help prevent dehydration. Liquids that help prevent dehydration include water, fruit juice, and broth. Do not drink liquids that contain caffeine. Caffeine can increase your risk for dehydration. Ask your healthcare provider how much liquid to drink each day.    Soothe a sore throat.  Gargle with warm salt water. Make salt water by dissolving ¼ teaspoon salt in 1 cup warm water. You may also suck on hard candy or throat lozenges. You may use a sore throat spray.    Use a humidifier or vaporizer.  Use a cool mist humidifier or a vaporizer to increase air moisture in your home. This may make it easier for you to breathe and help decrease your cough.    Use saline nasal drops as directed.  These help relieve congestion.    Apply petroleum-based jelly around the outside of your nostrils.  This can decrease irritation from blowing your nose.    Do not smoke.  Nicotine and other chemicals in cigarettes and cigars can make  your symptoms worse. They can also cause infections such as bronchitis or pneumonia. Ask your healthcare provider for information if you currently smoke and need help to quit. E-cigarettes or smokeless tobacco still contain nicotine. Talk to your healthcare provider before you use these products.    Prevent a cold:   Wash your hands often.  Use soap and water every time you wash your hands. Rub your soapy hands together, lacing your fingers. Use the fingers of one hand to scrub under the nails of the other hand. Wash for at least 20 seconds. Rinse with warm, running water for several seconds. Then dry your hands. Use hand  gel if soap and water are not available. Do not touch your eyes or mouth without washing your hands first.         Cover a sneeze or cough.  Use a tissue that covers your mouth and nose. Put the used tissue in the trash right away. Use the bend of your arm if a tissue is not available. Wash your hands well with soap and water or use a hand . Do not stand close to anyone who is sneezing or coughing.    Try to stay away from others while you are sick.  This is especially important during the first 2 to 3 days when the virus is more easily spread. Wait until a fever, cough, or other symptoms are gone before you return to work or other regular activities.    Do not share items while you are sick.  This includes food, drinks, eating utensils, and dishes.    Follow up with your doctor as directed:  Write down your questions so you remember to ask them during your visits.  © Copyright [employer] 2023 Information is for End User's use only and may not be sold, redistributed or otherwise used for commercial purposes.  The above information is an  only. It is not intended as medical advice for individual conditions or treatments. Talk to your doctor, nurse or pharmacist before following any medical regimen to see if it is safe and effective for you.

## 2024-05-16 NOTE — PROGRESS NOTES
[facility]        NAME: Natalya Mercado is a 38 y.o. female  : 1985    MRN: [id number]  DATE: May 16, 2024  TIME: 10:36 AM    Assessment and Plan   Acute URI [J06.9]  1. Acute URI  POCT rapid ANTIGEN strepA        Rapid strep: negative. Recommended OTC treatment of symptoms. Increase fluid intake.       Patient Instructions   Rapid strep: negative  Tylenol/Motrin as needed for pain/fever   Increase fluid intake   Throat lozenges, honey, salt water gargles for throat discomfort   Follow up with your PCP for worsening or concerning symptoms    Follow up with PCP in 3-5 days.  Proceed to  ER if symptoms worsen.    Chief Complaint     Chief Complaint   Patient presents with    Sore Throat     Patient reports sore throat, fevers, body aches          History of Present Illness       Patient is a 38-year-old female presenting with 2 days of fatigue, fever, sore throat, and bodyaches.  She is 11 weeks pregnant.  She states that yesterday she was taking Tylenol and her temperature went down to 100.1.  Denies abdominal pain.  She does feel nauseous but believes it is because she has had decreased appetite.  She is tolerating p.o. fluid intake.  Her daughter was recently sick with viral symptoms.    Sore Throat   Pertinent negatives include no abdominal pain, congestion, coughing or ear pain.       Review of Systems   Review of Systems   Constitutional:  Positive for fever. Negative for activity change and chills.   HENT:  Positive for sore throat. Negative for congestion, ear pain, sinus pressure and sinus pain.    Respiratory:  Negative for cough.    Gastrointestinal:  Negative for abdominal pain.   Musculoskeletal:  Positive for myalgias.         Current Medications       Current Outpatient Medications:     aspirin 81 mg chewable tablet, Chew 162 mg daily 2 times daily, Disp: , Rfl:     Prenatal MV & Min w/FA-DHA (PRENATAL GUMMIES PO), , Disp: , Rfl:     Cholecalciferol 125 MCG (5000 UT) capsule, Take  "5,000 Units by mouth daily (Patient not taking: Reported on 2024), Disp: , Rfl:     Current Allergies     Allergies as of 2024 - Reviewed 2024   Allergen Reaction Noted    Penicillins Anaphylaxis 1989    Iodine - food allergy GI Intolerance 2015            The following portions of the patient's history were reviewed and updated as appropriate: allergies, current medications, past family history, past medical history, past social history, past surgical history and problem list.     Past Medical History:   Diagnosis Date    Abnormal Pap smear of cervix     teens, and colpo with DR Toth    Depression     Hypertension     preeclampsia severe    Migraine     Varicella     as a child       Past Surgical History:   Procedure Laterality Date     SECTION      COLPOSCOPY      approx 2006    TYMPANOSTOMY TUBE PLACEMENT         Family History   Problem Relation Age of Onset    No Known Problems Mother     HIV Father     No Known Problems Sister     No Known Problems Daughter     Lymphoma Maternal Grandmother     Hypertension Maternal Grandmother     Dementia Maternal Grandfather     Aneurysm Paternal Grandmother         Brain    No Known Problems Paternal Grandfather          Medications have been verified.        Objective   BP 96/62   Pulse 88   Temp 98.4 °F (36.9 °C)   Ht 5' 6\" (1.676 m)   Wt 72.6 kg (160 lb)   LMP 2024 (Exact Date)   SpO2 96%   BMI 25.82 kg/m²        Physical Exam     Physical Exam  Vitals reviewed.   Constitutional:       General: She is awake. She is not in acute distress.     Appearance: Normal appearance. She is well-developed and normal weight.   HENT:      Head: Normocephalic.      Right Ear: Hearing, tympanic membrane, ear canal and external ear normal.      Left Ear: Hearing, tympanic membrane, ear canal and external ear normal.      Nose: Nose normal.      Mouth/Throat:      Lips: Pink.      Pharynx: Oropharynx is clear.   Cardiovascular:      " Rate and Rhythm: Normal rate and regular rhythm.      Heart sounds: Normal heart sounds, S1 normal and S2 normal.   Pulmonary:      Effort: Pulmonary effort is normal.      Breath sounds: Normal breath sounds. No decreased breath sounds, wheezing or rhonchi.   Skin:     General: Skin is warm and moist.   Neurological:      General: No focal deficit present.      Mental Status: She is alert and oriented to person, place, and time.   Psychiatric:         Behavior: Behavior is cooperative.

## 2024-05-20 ENCOUNTER — OFFICE VISIT (OUTPATIENT)
Dept: URGENT CARE | Age: 39
End: 2024-05-20
Payer: COMMERCIAL

## 2024-05-20 ENCOUNTER — TELEPHONE (OUTPATIENT)
Age: 39
End: 2024-05-20

## 2024-05-20 VITALS
SYSTOLIC BLOOD PRESSURE: 104 MMHG | DIASTOLIC BLOOD PRESSURE: 68 MMHG | RESPIRATION RATE: 18 BRPM | HEART RATE: 78 BPM | TEMPERATURE: 97.8 F | OXYGEN SATURATION: 98 %

## 2024-05-20 DIAGNOSIS — J01.00 ACUTE MAXILLARY SINUSITIS, RECURRENCE NOT SPECIFIED: Primary | ICD-10-CM

## 2024-05-20 DIAGNOSIS — H10.33 ACUTE CONJUNCTIVITIS OF BOTH EYES, UNSPECIFIED ACUTE CONJUNCTIVITIS TYPE: ICD-10-CM

## 2024-05-20 PROCEDURE — S9083 URGENT CARE CENTER GLOBAL: HCPCS

## 2024-05-20 PROCEDURE — G0383 LEV 4 HOSP TYPE B ED VISIT: HCPCS

## 2024-05-20 RX ORDER — AZITHROMYCIN 250 MG/1
TABLET, FILM COATED ORAL
Qty: 6 TABLET | Refills: 0 | Status: SHIPPED | OUTPATIENT
Start: 2024-05-20 | End: 2024-05-24

## 2024-05-20 RX ORDER — TOBRAMYCIN 3 MG/ML
1 SOLUTION/ DROPS OPHTHALMIC
Qty: 1.3 ML | Refills: 0 | Status: SHIPPED | OUTPATIENT
Start: 2024-05-20 | End: 2024-05-25

## 2024-05-20 NOTE — PATIENT INSTRUCTIONS
Use eye drops as prescribed.   You are contagious until you have been on drops for 24 hours.  Discussed hand hygiene washing hands frequently for at least 20 seconds with soap and water.  Wash all linens after single use in hot water.  Disinfect frequently touched surfaces on a regular basis.  If symptoms or not improved in 2 to 3 days follow-up with PCP or an eye care professional.  If symptoms worsen report to the emergency room immediately.     Take antibiotic as directed.  Recommend daily probiotic while on antibiotic or eat yogurt with live cultures daily while on antibiotic.        Follow up with your PCP or OB in 5-7days if no improvement.

## 2024-05-20 NOTE — TELEPHONE ENCOUNTER
Patient called, was prescribed a antibiotic for urgent care - Zithromax. Patient is calling in to confirm safe to take in pregnancy. Reviewed Epocrates - yes, patient is allowed to take during pregnancy. Patient made aware and had no additional questions.

## 2024-05-20 NOTE — PROGRESS NOTES
OB/GYN  PN Visit  Natalya Mercado  [id number]  2024  11:15 AM  Valentine Vaughan PA-C    S: 38 y.o.  11w5d here for PN visit. Pregnancy complicated by varicella equivocal, h/o preE, migraines, prior , AMA.     OB Complaints:  Denies c/o n/v/ha, no edema, no smoking, no DV.   No vb/lof  No cramping/ctxns or signs of PTL.    She reports that she has a current sinus infection but otherwise feeling well.  Established care with MF. Had NIPS drawn and plans US first week in .     O:    Pre- Vitals      Flowsheet Row Most Recent Value   Prenatal Assessment    Prenatal Vitals    Blood Pressure 108/60   Weight - Scale 76.7 kg (169 lb)   Urine Albumin/Glucose    Dilation/Effacement/Station    Vaginal Drainage    Edema               Gen: no acute distress, nonlabored breathing.  OB exam completed: fundal height, +FHT  Urine: -/-  TAUS done. CRL c/w 12w0d + FHR at 172 bpm. + FM seen.     A/P:  #1. 11w5d GESTATION  Physical exam deferred.  Cultures done today. Last pap: 24. Pap not done  today per ASCCP guidelines.   Will order AFP at next visit, will check vitamin D as well at that time.   R/w pt initial OB labs.       RTC in 4 weeks

## 2024-05-20 NOTE — PROGRESS NOTES
[facility]        NAME: Natalya Mercado is a 38 y.o. female  : 1985    MRN: [id number]  DATE: May 20, 2024  TIME: 11:56 AM    Assessment and Plan   Acute maxillary sinusitis, recurrence not specified [J01.00]  1. Acute maxillary sinusitis, recurrence not specified  azithromycin (ZITHROMAX) 250 mg tablet    tobramycin (TOBREX) 0.3 % SOLN      2. Acute conjunctivitis of both eyes, unspecified acute conjunctivitis type          Use eye drops as prescribed.   You are contagious until you have been on drops for 24 hours.  Discussed hand hygiene washing hands frequently for at least 20 seconds with soap and water.  Wash all linens after single use in hot water.  Disinfect frequently touched surfaces on a regular basis.  If symptoms or not improved in 2 to 3 days follow-up with PCP or an eye care professional.  If symptoms worsen report to the emergency room immediately.     Take antibiotic as directed.  Recommend daily probiotic while on antibiotic or eat yogurt with live cultures daily while on antibiotic.        Follow up with your PCP or OB in 5-7days if no improvement.     Patient Instructions       Follow up with PCP in 3-5 days.  Proceed to  ER if symptoms worsen.    If tests have been performed at [facility], our office will contact you with results if changes need to be made to the care plan discussed with you at the visit.  [facility]    Chief Complaint     Chief Complaint   Patient presents with   • Conjunctivitis     Patient woke up this morning with b/l eye redness and discharge. Daughter recently had pink eye.          History of Present Illness       Pt is a 38 year old female presenting with one day of bilateral eye redness and drainage.  Denies changes in vision.  She wears glasses, no contacts.  She denies pain.  Reports her daughter was recently treated for pink eye.  She 11 weeks pregnant- .  Pt was seen approximately 1 week ago for URI  symptoms, presenting today with continued symptoms of congestion, runny nose, post nasal drip and facial pressure.    Conjunctivitis   Associated symptoms include congestion and rhinorrhea. Pertinent negatives include no fever, no abdominal pain, no constipation, no diarrhea, no nausea, no vomiting, no ear pain, no hearing loss, no stridor, no cough and no wheezing.       Review of Systems   Review of Systems   Constitutional:  Negative for chills and fever.   HENT:  Positive for congestion, postnasal drip, rhinorrhea, sinus pressure and sinus pain. Negative for ear pain, facial swelling and hearing loss.    Respiratory:  Negative for cough, chest tightness, shortness of breath, wheezing and stridor.    Cardiovascular:  Negative for chest pain, palpitations and leg swelling.   Gastrointestinal:  Negative for abdominal distention, abdominal pain, constipation, diarrhea, nausea and vomiting.         Current Medications       Current Outpatient Medications:   •  aspirin 81 mg chewable tablet, Chew 162 mg daily 2 times daily, Disp: , Rfl:   •  azithromycin (ZITHROMAX) 250 mg tablet, Take 2 tablets today then 1 tablet daily x 4 days, Disp: 6 tablet, Rfl: 0  •  Prenatal MV & Min w/FA-DHA (PRENATAL GUMMIES PO), , Disp: , Rfl:   •  tobramycin (TOBREX) 0.3 % SOLN, Administer 1 drop into the left eye every 4 (four) hours while awake for 5 days, Disp: 1.3 mL, Rfl: 0  •  Cholecalciferol 125 MCG (5000 UT) capsule, Take 5,000 Units by mouth daily (Patient not taking: Reported on 4/30/2024), Disp: , Rfl:     Current Allergies     Allergies as of 05/20/2024 - Reviewed 05/20/2024   Allergen Reaction Noted   • Penicillins Anaphylaxis 01/23/1989   • Iodine - food allergy GI Intolerance 07/02/2015            The following portions of the patient's history were reviewed and updated as appropriate: allergies, current medications, past family history, past medical history, past social history, past surgical history and problem list.      Past Medical History:   Diagnosis Date   • Abnormal Pap smear of cervix     teens, and colpo with DR Toth   • Depression    • Hypertension     preeclampsia severe   • Migraine    • Varicella     as a child       Past Surgical History:   Procedure Laterality Date   •  SECTION     • COLPOSCOPY      approx    • TYMPANOSTOMY TUBE PLACEMENT         Family History   Problem Relation Age of Onset   • No Known Problems Mother    • HIV Father    • No Known Problems Sister    • No Known Problems Daughter    • Lymphoma Maternal Grandmother    • Hypertension Maternal Grandmother    • Dementia Maternal Grandfather    • Aneurysm Paternal Grandmother         Brain   • No Known Problems Paternal Grandfather          Medications have been verified.        Objective   /68   Pulse 78   Temp 97.8 °F (36.6 °C)   Resp 18   LMP 2024 (Exact Date)   SpO2 98%   Patient's last menstrual period was 2024 (exact date).       Physical Exam     Physical Exam  Vitals and nursing note reviewed.   Constitutional:       General: She is not in acute distress.     Appearance: Normal appearance. She is normal weight. She is not ill-appearing.   HENT:      Head: Normocephalic.      Right Ear: There is impacted cerumen.      Left Ear: Tympanic membrane normal.      Nose: Congestion and rhinorrhea present.      Mouth/Throat:      Mouth: Mucous membranes are moist.      Pharynx: Posterior oropharyngeal erythema present.   Eyes:      General: Lids are normal. Vision grossly intact. Gaze aligned appropriately.         Right eye: Discharge present.         Left eye: Discharge present.     Extraocular Movements: Extraocular movements intact.      Right eye: Normal extraocular motion.      Left eye: Normal extraocular motion.      Conjunctiva/sclera:      Right eye: Right conjunctiva is injected.      Left eye: Left conjunctiva is injected.      Comments: Visual Acuity: corrected with glasses   Right eye: 20/20  Left Eye:  20/20  Both eyes: 20/15   Cardiovascular:      Rate and Rhythm: Normal rate.      Pulses: Normal pulses.   Pulmonary:      Effort: Pulmonary effort is normal. No respiratory distress.      Breath sounds: Normal breath sounds. No stridor. No wheezing, rhonchi or rales.   Chest:      Chest wall: No tenderness.   Abdominal:      General: Abdomen is flat.   Musculoskeletal:      Cervical back: Normal range of motion.   Skin:     General: Skin is warm.      Capillary Refill: Capillary refill takes less than 2 seconds.   Neurological:      Mental Status: She is alert.

## 2024-05-21 ENCOUNTER — INITIAL PRENATAL (OUTPATIENT)
Dept: OBGYN CLINIC | Facility: CLINIC | Age: 39
End: 2024-05-21

## 2024-05-21 VITALS
BODY MASS INDEX: 27.16 KG/M2 | HEIGHT: 66 IN | SYSTOLIC BLOOD PRESSURE: 108 MMHG | DIASTOLIC BLOOD PRESSURE: 60 MMHG | WEIGHT: 169 LBS

## 2024-05-21 DIAGNOSIS — Z34.91 FIRST TRIMESTER PREGNANCY: Primary | ICD-10-CM

## 2024-05-21 PROCEDURE — PNV: Performed by: PHYSICIAN ASSISTANT

## 2024-05-21 PROCEDURE — 87491 CHLMYD TRACH DNA AMP PROBE: CPT | Performed by: PHYSICIAN ASSISTANT

## 2024-05-21 PROCEDURE — 87591 N.GONORRHOEAE DNA AMP PROB: CPT | Performed by: PHYSICIAN ASSISTANT

## 2024-05-22 LAB
C TRACH DNA SPEC QL NAA+PROBE: NEGATIVE
N GONORRHOEA DNA SPEC QL NAA+PROBE: NEGATIVE

## 2024-05-28 LAB
APOB+LDLR+PCSK9 GENE MUT ANL BLD/T: NOT DETECTED
BRCA1+BRCA2 DEL+DUP + FULL MUT ANL BLD/T: NOT DETECTED
MLH1+MSH2+MSH6+PMS2 GN DEL+DUP+FUL M: NOT DETECTED

## 2024-06-05 ENCOUNTER — ROUTINE PRENATAL (OUTPATIENT)
Facility: HOSPITAL | Age: 39
End: 2024-06-05
Payer: COMMERCIAL

## 2024-06-05 VITALS
SYSTOLIC BLOOD PRESSURE: 102 MMHG | BODY MASS INDEX: 27.9 KG/M2 | DIASTOLIC BLOOD PRESSURE: 66 MMHG | WEIGHT: 173.6 LBS | HEART RATE: 95 BPM | HEIGHT: 66 IN

## 2024-06-05 DIAGNOSIS — O09.521 SUPERVISION OF ELDERLY MULTIGRAVIDA, FIRST TRIMESTER: ICD-10-CM

## 2024-06-05 DIAGNOSIS — Z36.82 NUCHAL TRANSLUCENCY OF FETUS ON PRENATAL ULTRASOUND: ICD-10-CM

## 2024-06-05 DIAGNOSIS — O09.291 HX OF PREECLAMPSIA, PRIOR PREGNANCY, CURRENTLY PREGNANT, FIRST TRIMESTER: Primary | ICD-10-CM

## 2024-06-05 DIAGNOSIS — Z3A.13 13 WEEKS GESTATION OF PREGNANCY: ICD-10-CM

## 2024-06-05 PROCEDURE — 99213 OFFICE O/P EST LOW 20 MIN: CPT | Performed by: OBSTETRICS & GYNECOLOGY

## 2024-06-05 PROCEDURE — 76813 OB US NUCHAL MEAS 1 GEST: CPT | Performed by: OBSTETRICS & GYNECOLOGY

## 2024-06-05 PROCEDURE — 76801 OB US < 14 WKS SINGLE FETUS: CPT | Performed by: OBSTETRICS & GYNECOLOGY

## 2024-06-05 NOTE — LETTER
June 7, 2024     Sylvester Cordova PA-C  [address] [place] [address]    Patient: Natalya Mercado   YOB: 1985   Date of Visit: 6/5/2024       Dear Ms. Cordova:    Thank you for referring Natalya Mercado to me for evaluation. Below are my notes for this consultation.    If you have questions, please do not hesitate to call me. I look forward to following your patient along with you.         Sincerely,        Blue Navarro MD        CC: No Recipients    Blue Navarro MD  6/7/2024  4:06 PM  Sign when Signing Visit  A fetal ultrasound was completed. See Ob procedures in Epic for an interpretation and recommendations. Do not hesitate to contact us in [facility] if you have questions.    Blue Navarro MD, MSCE  Maternal Fetal Medicine

## 2024-06-07 NOTE — PROGRESS NOTES
A fetal ultrasound was completed. See Ob procedures in Epic for an interpretation and recommendations. Do not hesitate to contact us in [facility] if you have questions.    Blue Navarro MD, MSCE  Maternal Fetal Medicine

## 2024-06-13 NOTE — PROGRESS NOTES
VISIT 39 yr old  at 15w5d: (+) edema - ankles - at the end of day mckenzie if on feet; Denies n/v/HA/cramping/vb/lof/dv/smoking; urine neg/neg  Labs up to date; NIPT screen neg; reviewed and ordered msAFP - best to get done btw 16-18w/prior to level 2; vit D3 low - plan repeat with msAFP; PNC - level 2 at 20w;  PNVs + DHA - tolerating daily  No FM yet    Encouraged hydration.   RTO in 4 weeks for routine ob check or sooner if needed

## 2024-06-18 ENCOUNTER — ROUTINE PRENATAL (OUTPATIENT)
Dept: OBGYN CLINIC | Facility: CLINIC | Age: 39
End: 2024-06-18

## 2024-06-18 VITALS
WEIGHT: 178 LBS | HEIGHT: 66 IN | DIASTOLIC BLOOD PRESSURE: 64 MMHG | BODY MASS INDEX: 28.61 KG/M2 | SYSTOLIC BLOOD PRESSURE: 100 MMHG

## 2024-06-18 DIAGNOSIS — R79.89 LOW VITAMIN D LEVEL: ICD-10-CM

## 2024-06-18 DIAGNOSIS — Z34.82 ENCOUNTER FOR SUPERVISION OF NORMAL PREGNANCY IN MULTIGRAVIDA IN SECOND TRIMESTER: Primary | ICD-10-CM

## 2024-06-18 PROCEDURE — PNV: Performed by: PHYSICIAN ASSISTANT

## 2024-06-26 ENCOUNTER — TELEPHONE (OUTPATIENT)
Dept: OBGYN CLINIC | Facility: CLINIC | Age: 39
End: 2024-06-26

## 2024-06-26 NOTE — TELEPHONE ENCOUNTER
Called the patient and left a voice message to return the call for her 2nd trimester call, also sent a message via my chart :  .Dayo Hoang,    It's time for our 2nd trimester call! I will be reaching out next week to complete our check-in. My number may come up as spam and/or healthcare depending on your phone carrier settings. I have attached a Depression Questionnaire if you could please fill this as soon as you can prior to our phone call, I'd greatly appreciate it!     Please let me know if it is easier to have this check-in via [employer], I will gladly message you my questions if this works better for you.       Thank you,  Elías Walsh   OB Navigator   [facility]

## 2024-06-27 ENCOUNTER — APPOINTMENT (OUTPATIENT)
Dept: LAB | Facility: AMBULARY SURGERY CENTER | Age: 39
End: 2024-06-27
Payer: COMMERCIAL

## 2024-06-27 DIAGNOSIS — R79.89 LOW VITAMIN D LEVEL: ICD-10-CM

## 2024-06-27 DIAGNOSIS — Z34.82 ENCOUNTER FOR SUPERVISION OF NORMAL PREGNANCY IN MULTIGRAVIDA IN SECOND TRIMESTER: ICD-10-CM

## 2024-06-27 LAB — 25(OH)D3 SERPL-MCNC: 36 NG/ML (ref 30–100)

## 2024-06-27 PROCEDURE — 82306 VITAMIN D 25 HYDROXY: CPT

## 2024-06-27 PROCEDURE — 82105 ALPHA-FETOPROTEIN SERUM: CPT

## 2024-06-27 PROCEDURE — 36415 COLL VENOUS BLD VENIPUNCTURE: CPT

## 2024-06-29 LAB
2ND TRIMESTER 4 SCREEN SERPL-IMP: NORMAL
AFP ADJ MOM SERPL: 1.68
AFP INTERP AMN-IMP: NORMAL
AFP INTERP SERPL-IMP: NORMAL
AFP INTERP SERPL-IMP: NORMAL
AFP SERPL-MCNC: 58.4 NG/ML
AGE AT DELIVERY: 38.5 YR
GA METHOD: NORMAL
GA: 17 WEEKS
IDDM PATIENT QL: NO
MULTIPLE PREGNANCY: NO
NEURAL TUBE DEFECT RISK FETUS: 1713 %

## 2024-07-16 ENCOUNTER — ROUTINE PRENATAL (OUTPATIENT)
Dept: OBGYN CLINIC | Facility: CLINIC | Age: 39
End: 2024-07-16

## 2024-07-16 VITALS — BODY MASS INDEX: 30.34 KG/M2 | SYSTOLIC BLOOD PRESSURE: 112 MMHG | DIASTOLIC BLOOD PRESSURE: 72 MMHG | WEIGHT: 188 LBS

## 2024-07-16 DIAGNOSIS — Z34.92 SECOND TRIMESTER FETUS: Primary | ICD-10-CM

## 2024-07-16 PROCEDURE — PNV: Performed by: PHYSICIAN ASSISTANT

## 2024-07-16 NOTE — PROGRESS NOTES
OB/GYN  PN Visit  Natalya Mercado  [id number]  2024  2:11 PM  Valentine Vaughan PA-C    S: 39 y.o.  19w5d here for PN visit. Pregnancy complicated by AMA, varicella equivocal, h/o severe preE, prior LTCS, migraines.     OB complaints:  Denies c/o n/v/ha, no edema, no smoking, no DV.   No vb/lof  No cramping/ctxns or signs of PTL.    She reports that overall she is feeling well. She has been feeling regular FM over the last few days.   We did review overall weight gain, 21 lbs thus far. We reviewed healthy food choices that lead to longer satiety but overall encouraged healthy eating and exercise.       O:    Pre-Pipe Vitals    Flowsheet Row Most Recent Value   Prenatal Assessment    Fetal Heart Rate 160   Fundal Height (cm) 20 cm   Movement Present   Prenatal Vitals    Blood Pressure 112/72   Weight - Scale 85.3 kg (188 lb)   Urine Albumin/Glucose    Dilation/Effacement/Station    Vaginal Drainage    Draining Fluid No   Edema    LLE Edema None   RLE Edema None   Facial Edema None            Gen: no acute distress, nonlabored breathing.  OB exam completed: fundal height, +FHT.  Urine: -/-    A/P:  #1. 19w5d GESTATION  Labor precautions reviewed  Fetal kick counts reviewed  Blood type: 0+  Pt desires repeat . Request sent for 12/3/24 to clinical team to schedule if able at 8 am.   RTC in 4 weeks    Valentine Vaughan PA-C  2024  2:11 PM

## 2024-07-18 ENCOUNTER — TELEPHONE (OUTPATIENT)
Dept: OBGYN CLINIC | Facility: CLINIC | Age: 39
End: 2024-07-18

## 2024-07-18 NOTE — TELEPHONE ENCOUNTER
----- Message from Valentine Vaughan PA-C sent at 2024  1:59 PM EDT -----  Regarding: nurse geovanni team  Pt desires to schedule repeat  12/3/24 at 8 am if possible please and thank you.

## 2024-07-19 NOTE — TELEPHONE ENCOUNTER
Spoke with Miguelina @ [facility] re: scheduling repeat C/S - scheduled for 12/3/2024 @ 8:00 am.  [place] sticky note updated & also noted on pink sticky note will need to send staff message to Dr Murphy re: same when she starts with [facility].  Left message patient's voicemail to recall offcie re: repeat C/S date/time.

## 2024-07-19 NOTE — TELEPHONE ENCOUNTER
PEP notified OB geovanni via teams messaging that patient had returned call. Requested to call patient to review scheduling details.    Placed call to patient, left a non detailed message to return our call.

## 2024-07-24 ENCOUNTER — ROUTINE PRENATAL (OUTPATIENT)
Facility: HOSPITAL | Age: 39
End: 2024-07-24
Payer: COMMERCIAL

## 2024-07-24 VITALS
BODY MASS INDEX: 30.51 KG/M2 | HEIGHT: 66 IN | HEART RATE: 88 BPM | DIASTOLIC BLOOD PRESSURE: 60 MMHG | SYSTOLIC BLOOD PRESSURE: 120 MMHG | WEIGHT: 189.82 LBS

## 2024-07-24 DIAGNOSIS — Z36.86 ENCOUNTER FOR ANTENATAL SCREENING FOR CERVICAL LENGTH: ICD-10-CM

## 2024-07-24 DIAGNOSIS — Z3A.20 20 WEEKS GESTATION OF PREGNANCY: ICD-10-CM

## 2024-07-24 DIAGNOSIS — O09.522 SUPERVISION OF ELDERLY MULTIGRAVIDA, SECOND TRIMESTER: Primary | ICD-10-CM

## 2024-07-24 PROCEDURE — 99213 OFFICE O/P EST LOW 20 MIN: CPT | Performed by: OBSTETRICS & GYNECOLOGY

## 2024-07-24 PROCEDURE — 76817 TRANSVAGINAL US OBSTETRIC: CPT | Performed by: OBSTETRICS & GYNECOLOGY

## 2024-07-24 PROCEDURE — 76811 OB US DETAILED SNGL FETUS: CPT | Performed by: OBSTETRICS & GYNECOLOGY

## 2024-07-24 NOTE — PROGRESS NOTES
Ultrasound Probe Disinfection    A transvaginal ultrasound was performed.   Prior to use, disinfection was performed with High Level Disinfection Process ([employer]).  Probe serial number A4: [id number] was used.    Chaperone: Juliana Reyes, Medical Assistant  Alex Sorenson RDMS

## 2024-07-24 NOTE — PROGRESS NOTES
The patient was seen today for an ultrasound.  Please see ultrasound report (located under Ob Procedures) for additional details.   Thank you very much for allowing us to participate in the care of this very nice patient.  Should you have any questions, please do not hesitate to contact me.     Beto Adrian MD FACOG  Attending Physician, Maternal-Fetal Medicine  [facility]

## 2024-08-13 ENCOUNTER — ROUTINE PRENATAL (OUTPATIENT)
Dept: OBGYN CLINIC | Facility: CLINIC | Age: 39
End: 2024-08-13

## 2024-08-13 VITALS
SYSTOLIC BLOOD PRESSURE: 120 MMHG | HEIGHT: 66 IN | BODY MASS INDEX: 31.4 KG/M2 | WEIGHT: 195.4 LBS | DIASTOLIC BLOOD PRESSURE: 70 MMHG

## 2024-08-13 DIAGNOSIS — O09.292 HX OF PREECLAMPSIA, PRIOR PREGNANCY, CURRENTLY PREGNANT, SECOND TRIMESTER: Primary | ICD-10-CM

## 2024-08-13 DIAGNOSIS — Z34.92 PRENATAL CARE IN SECOND TRIMESTER: ICD-10-CM

## 2024-08-13 PROCEDURE — PNV: Performed by: STUDENT IN AN ORGANIZED HEALTH CARE EDUCATION/TRAINING PROGRAM

## 2024-08-13 NOTE — PROGRESS NOTES
Natalya is a 39-year-old -0-0-1 at 23 weeks and 5 days presenting for routine prenatal care-she denies any cramping, contractions, loss of fluid or vaginal bleeding.  She does report some increasing edema in her lower extremities but denies any other preeclamptic symptoms.  She does report good fetal movement.    Patient does have a pregnancy that is complicated by a history of preeclampsia with severe features, prior  delivery, varicella equivocal and history of migraines.  Preeclamptic precautions were reviewed today and we discussed following her closely-baseline preeclamptic labs were completed and within normal limits.    Third trimester labs were ordered for her today and reviewed.  Return to office in 4 weeks or sooner if needed.

## 2024-09-09 NOTE — PROGRESS NOTES
OB/GYN  PN Visit  Natalya Mercado  [id number]  2024  11:52 AM  NIMA Schuler    S: 39 y.o.  28w0d here for PN visit. Pregnancy complicated by AMA, varicella equivocal, h/o severe preE, prior LTCS, migraines.     OB complaints:  Denies c/o n/v/ha, no edema, no smoking, no DV.   No vb/lof  No cramping/ctxns or signs of PTL.    She reports that she feels tired. Feels good FM.      O:    Pre- Vitals      Flowsheet Row Most Recent Value   Prenatal Assessment    Fetal Heart Rate 145   Fundal Height (cm) 28 cm   Movement Present   Prenatal Vitals    Blood Pressure 116/68   Weight - Scale 89.8 kg (198 lb)   Urine Albumin/Glucose    Dilation/Effacement/Station    Vaginal Drainage    Edema                 Gen: no acute distress, nonlabored breathing.  OB exam completed: fundal height, +FHT.  Urine: -/-    A/P:  #1. 28w0d GESTATION  Labor precautions reviewed  Fetal kick counts reviewed  Blood type: 0+  Delivery: RLTCS scheduled 12/3/24  Labs UTD; anemia noted, started iron. Recommend repeat CBC in 4 weeks.  Tdap: given today.  Desires flu vaccine at next appt.   RSV recommended at 32 weeks.  Breastfeeding: yes, pump ordered today.  Contraception: desires bilateral salpingectomy. Reviewed that the procedure can be done at time of LTCS. Counseled patient that the procedure is irreversible and 99.999% effective as a contraception. Any desired future pregnancies would require IVF. Patient still desires.   Pediatrician: established    NIMA Schuler  2024  11:52 AM

## 2024-09-10 ENCOUNTER — APPOINTMENT (OUTPATIENT)
Dept: LAB | Facility: AMBULARY SURGERY CENTER | Age: 39
End: 2024-09-10
Payer: COMMERCIAL

## 2024-09-10 DIAGNOSIS — Z34.92 PRENATAL CARE IN SECOND TRIMESTER: ICD-10-CM

## 2024-09-10 LAB
ERYTHROCYTE [DISTWIDTH] IN BLOOD BY AUTOMATED COUNT: 12.4 % (ref 11.6–15.1)
GLUCOSE 1H P 50 G GLC PO SERPL-MCNC: 129 MG/DL (ref 70–134)
HCT VFR BLD AUTO: 31.6 % (ref 34.8–46.1)
HGB BLD-MCNC: 10.2 G/DL (ref 11.5–15.4)
MCH RBC QN AUTO: 29.2 PG (ref 26.8–34.3)
MCHC RBC AUTO-ENTMCNC: 32.3 G/DL (ref 31.4–37.4)
MCV RBC AUTO: 91 FL (ref 82–98)
PLATELET # BLD AUTO: 308 THOUSANDS/UL (ref 149–390)
PMV BLD AUTO: 11.6 FL (ref 8.9–12.7)
RBC # BLD AUTO: 3.49 MILLION/UL (ref 3.81–5.12)
TREPONEMA PALLIDUM IGG+IGM AB [PRESENCE] IN SERUM OR PLASMA BY IMMUNOASSAY: NORMAL
WBC # BLD AUTO: 8.63 THOUSAND/UL (ref 4.31–10.16)

## 2024-09-10 PROCEDURE — 36415 COLL VENOUS BLD VENIPUNCTURE: CPT

## 2024-09-10 PROCEDURE — 86780 TREPONEMA PALLIDUM: CPT

## 2024-09-10 PROCEDURE — 85027 COMPLETE CBC AUTOMATED: CPT

## 2024-09-10 PROCEDURE — 82950 GLUCOSE TEST: CPT

## 2024-09-12 ENCOUNTER — ROUTINE PRENATAL (OUTPATIENT)
Dept: OBGYN CLINIC | Facility: CLINIC | Age: 39
End: 2024-09-12
Payer: COMMERCIAL

## 2024-09-12 VITALS
WEIGHT: 198 LBS | BODY MASS INDEX: 31.82 KG/M2 | SYSTOLIC BLOOD PRESSURE: 116 MMHG | HEIGHT: 66 IN | DIASTOLIC BLOOD PRESSURE: 68 MMHG

## 2024-09-12 DIAGNOSIS — Z34.93 PRENATAL CARE IN THIRD TRIMESTER: Primary | ICD-10-CM

## 2024-09-12 DIAGNOSIS — O09.522 SUPERVISION OF ELDERLY MULTIGRAVIDA, SECOND TRIMESTER: ICD-10-CM

## 2024-09-12 DIAGNOSIS — Z23 ENCOUNTER FOR IMMUNIZATION: ICD-10-CM

## 2024-09-12 PROCEDURE — 90715 TDAP VACCINE 7 YRS/> IM: CPT

## 2024-09-12 PROCEDURE — 90471 IMMUNIZATION ADMIN: CPT

## 2024-09-12 PROCEDURE — PNV

## 2024-09-12 NOTE — PROGRESS NOTES
Tdap given in right deltoid per pt request, no previous reactions to tdap or any other immunization. Pt tolerated well. VIS given at time of administration.

## 2024-09-17 LAB
DME PARACHUTE DELIVERY DATE REQUESTED: NORMAL
DME PARACHUTE ITEM DESCRIPTION: NORMAL
DME PARACHUTE ORDER STATUS: NORMAL
DME PARACHUTE SUPPLIER NAME: NORMAL
DME PARACHUTE SUPPLIER PHONE: NORMAL

## 2024-09-19 ENCOUNTER — ULTRASOUND (OUTPATIENT)
Facility: HOSPITAL | Age: 39
End: 2024-09-19
Payer: COMMERCIAL

## 2024-09-19 VITALS
BODY MASS INDEX: 32.02 KG/M2 | SYSTOLIC BLOOD PRESSURE: 100 MMHG | HEIGHT: 66 IN | WEIGHT: 199.2 LBS | DIASTOLIC BLOOD PRESSURE: 68 MMHG | HEART RATE: 91 BPM

## 2024-09-19 DIAGNOSIS — Z3A.29 29 WEEKS GESTATION OF PREGNANCY: Primary | ICD-10-CM

## 2024-09-19 DIAGNOSIS — Z36.89 ENCOUNTER FOR ULTRASOUND TO CHECK FETAL GROWTH: ICD-10-CM

## 2024-09-19 DIAGNOSIS — O09.299 HX OF PREECLAMPSIA, PRIOR PREGNANCY, CURRENTLY PREGNANT: ICD-10-CM

## 2024-09-19 DIAGNOSIS — O09.523 MULTIGRAVIDA OF ADVANCED MATERNAL AGE IN THIRD TRIMESTER: ICD-10-CM

## 2024-09-19 PROCEDURE — 76816 OB US FOLLOW-UP PER FETUS: CPT | Performed by: STUDENT IN AN ORGANIZED HEALTH CARE EDUCATION/TRAINING PROGRAM

## 2024-09-19 PROCEDURE — 99213 OFFICE O/P EST LOW 20 MIN: CPT | Performed by: STUDENT IN AN ORGANIZED HEALTH CARE EDUCATION/TRAINING PROGRAM

## 2024-09-19 RX ORDER — PNV NO.95/FERROUS FUM/FOLIC AC 28MG-0.8MG
TABLET ORAL
COMMUNITY

## 2024-09-19 NOTE — PROGRESS NOTES
"[facility]: Ms. Mercado was seen today for fetal growth assessment ultrasound.  See ultrasound report under \"OB Procedures\" tab.   The time spent on this established patient on the encounter date included 5 minutes previsit service time reviewing records and precharting, 5 minutes face-to-face service time counseling regarding results and coordinating care, and  5 minutes charting, totalling 15 minutes.  Please don't hesitate to contact our office with any concerns or questions.  -Arlet Pittman MD    "

## 2024-09-20 NOTE — PROGRESS NOTES
OB/GYN  PN Visit  Natalya Mercado  [id number]  2024  1:56 PM  NIMA Schuler    S: 39 y.o.  30w0d here for PN visit. Pregnancy complicated by AMA, varicella equivocal, h/o severe preE, prior LTCS, migraines.     OB complaints:  Denies c/o n/v/ha, no edema, no smoking, no DV.   No vb/lof  No cramping/ctxns or signs of PTL.    She reports that she feels tired. Feels good FM. She reports pelvic pressure.       O:    Pre- Vitals      Flowsheet Row Most Recent Value   Prenatal Assessment    Fetal Heart Rate 140   Fundal Height (cm) 29 cm   Movement Present   Prenatal Vitals    Blood Pressure 124/72   Weight - Scale 90.7 kg (200 lb)   Urine Albumin/Glucose    Dilation/Effacement/Station    Vaginal Drainage    Edema                   Gen: no acute distress, nonlabored breathing.  OB exam completed: fundal height, +FHT.  Urine: -/-    A/P:  #1. 30w0d GESTATION  Labor precautions reviewed  Fetal kick counts reviewed  Blood type: 0+  Delivery: RLTCS scheduled 12/3/24  Labs UTD; anemia noted, started iron. Recommend repeat CBC in 4 weeks.  Tdap: given  Desires flu vaccine at next appt.   RSV recommended at 32 weeks.  Breastfeeding: yes, pump ordered today.  Contraception: desires bilateral salpingectomy. Previously counseled. Patient still desires.   Pediatrician: established.   UC ordered today due to pelvic pressure. Advised belly support bands.     - Third trimester packet provided and reviewed:   - Birth room rules and acknowledgement, birth plan, authorization for release of protected health information for photographers and birth certificate/SS worksheet to be brought to hospital at time of delivery.   -Delivery Consent signed today.    - FKC - 10 in 2 hours  -Perineal Massages to start at 34 weeks  -Last week of pregnancy and when to call      NIMA Schuler  2024  1:56 PM

## 2024-09-25 LAB
DME PARACHUTE DELIVERY DATE ACTUAL: NORMAL
DME PARACHUTE DELIVERY DATE REQUESTED: NORMAL
DME PARACHUTE ITEM DESCRIPTION: NORMAL
DME PARACHUTE ORDER STATUS: NORMAL
DME PARACHUTE SUPPLIER NAME: NORMAL
DME PARACHUTE SUPPLIER PHONE: NORMAL

## 2024-09-26 ENCOUNTER — ROUTINE PRENATAL (OUTPATIENT)
Dept: OBGYN CLINIC | Facility: CLINIC | Age: 39
End: 2024-09-26

## 2024-09-26 VITALS
SYSTOLIC BLOOD PRESSURE: 124 MMHG | BODY MASS INDEX: 32.14 KG/M2 | WEIGHT: 200 LBS | DIASTOLIC BLOOD PRESSURE: 72 MMHG | HEIGHT: 66 IN

## 2024-09-26 DIAGNOSIS — Z34.93 PRENATAL CARE IN THIRD TRIMESTER: Primary | ICD-10-CM

## 2024-09-26 DIAGNOSIS — O09.522 SUPERVISION OF ELDERLY MULTIGRAVIDA, SECOND TRIMESTER: ICD-10-CM

## 2024-09-26 PROCEDURE — PNV

## 2024-10-08 ENCOUNTER — ROUTINE PRENATAL (OUTPATIENT)
Dept: OBGYN CLINIC | Facility: CLINIC | Age: 39
End: 2024-10-08

## 2024-10-08 VITALS
SYSTOLIC BLOOD PRESSURE: 112 MMHG | HEIGHT: 66 IN | DIASTOLIC BLOOD PRESSURE: 68 MMHG | WEIGHT: 208 LBS | BODY MASS INDEX: 33.43 KG/M2

## 2024-10-08 DIAGNOSIS — Z34.93 THIRD TRIMESTER PREGNANCY: Primary | ICD-10-CM

## 2024-10-08 DIAGNOSIS — Z3A.32 32 WEEKS GESTATION OF PREGNANCY: ICD-10-CM

## 2024-10-08 DIAGNOSIS — O26.899 PELVIC PRESSURE IN PREGNANCY: ICD-10-CM

## 2024-10-08 DIAGNOSIS — R10.2 PELVIC PRESSURE IN PREGNANCY: ICD-10-CM

## 2024-10-08 PROCEDURE — PNV: Performed by: PHYSICIAN ASSISTANT

## 2024-10-08 NOTE — PROGRESS NOTES
"OB/GYN  PN Visit  Natalya Mercado  [id number]  10/8/2024  1:27 PM  NIMA Bhardwaj    S: 39 y.o.  31w5d here for PN visit. Pregnancy complicated by AMA, equivocal varicella, h/o severe pre-eclampsia, anemia, and prior LTCS.     OB complaints:  Denies c/o n/v/ha, no smoking, no DV.   No vb/lof  No cramping/ctxns or signs of PTL.    She reports having pelvic pressure worsening at night after working all day. Says it \"feels like a tourniquet\" around belly. Using abdominal support bands with minimal relief. Having infrequent intermittent kelly lucero. Reports having pelvic pressure for several weeks. Denies leakage of fluid, contractions, abdominal pain, vaginal bleeding, vaginal discharge and urinary symptoms.     O:    Pre- Vitals      Flowsheet Row Most Recent Value   Prenatal Assessment    Fetal Heart Rate 135   Fundal Height (cm) 32 cm   Movement Present   Prenatal Vitals    Blood Pressure 112/68   Weight - Scale 94.3 kg (208 lb)   Urine Albumin/Glucose    Dilation/Effacement/Station    Vaginal Drainage    Edema    LLE Edema Non-pitting   RLE Edema Non-pitting              Gen: no acute distress, nonlabored breathing. Abdomen gravid, nontender.   OB exam completed: fundal height, +FHT.  Urine: -/-    A/P:    #1. 31w5d GESTATION    Labs CBC at 34 weeks. Urine culture pending.   Blood Type: O+ RhoGam not indicated   GBS: will collect at 36 weeks.     Vaccines:  Tdap: given 24  Flu: desires at next visit.     Rhogam: N/A O+  Birth Plan: RLTCS on 12/3/24. Desires possible TOLAC if spontaneous labor.   Yellow packet given and consents signed given and signed on 24.   Breast pump: pump ordered 24.  Pediatrician: established  Contraception: desires Bilateral Salpingectomy.   Ultrasounds: as needed   Patient compliant with PO aspirin and iron.     Discussed pelvic floor physical therapy and patient agreeable.   Reviewed signs and symptoms of  labor and when to seek immediate " medical attention.   Fetal kick counts reviewed   RTC in 2 weeks    NIMA Bhardwaj  10/8/2024  1:27 PM

## 2024-10-10 ENCOUNTER — TELEPHONE (OUTPATIENT)
Dept: OBGYN CLINIC | Facility: CLINIC | Age: 39
End: 2024-10-10

## 2024-10-17 ENCOUNTER — NURSE TRIAGE (OUTPATIENT)
Age: 39
End: 2024-10-17

## 2024-10-17 ENCOUNTER — HOSPITAL ENCOUNTER (OUTPATIENT)
Facility: HOSPITAL | Age: 39
Discharge: HOME | End: 2024-10-17
Attending: STUDENT IN AN ORGANIZED HEALTH CARE EDUCATION/TRAINING PROGRAM | Admitting: STUDENT IN AN ORGANIZED HEALTH CARE EDUCATION/TRAINING PROGRAM
Payer: COMMERCIAL

## 2024-10-17 ENCOUNTER — APPOINTMENT (OUTPATIENT)
Dept: VASCULAR ULTRASOUND | Facility: HOSPITAL | Age: 39
End: 2024-10-17
Payer: COMMERCIAL

## 2024-10-17 VITALS
SYSTOLIC BLOOD PRESSURE: 106 MMHG | OXYGEN SATURATION: 99 % | HEART RATE: 94 BPM | RESPIRATION RATE: 22 BRPM | DIASTOLIC BLOOD PRESSURE: 65 MMHG | TEMPERATURE: 98.9 F

## 2024-10-17 PROBLEM — Z3A.33 33 WEEKS GESTATION OF PREGNANCY: Status: ACTIVE | Noted: 2024-10-08

## 2024-10-17 PROBLEM — O47.9 IRREGULAR CONTRACTIONS: Status: ACTIVE | Noted: 2024-10-17

## 2024-10-17 PROBLEM — D64.9 ANEMIA: Status: ACTIVE | Noted: 2024-10-17

## 2024-10-17 LAB
ERYTHROCYTE [DISTWIDTH] IN BLOOD BY AUTOMATED COUNT: 13.5 % (ref 11.6–15.1)
HCT VFR BLD AUTO: 32.8 % (ref 34.8–46.1)
HGB BLD-MCNC: 10.6 G/DL (ref 11.5–15.4)
MCH RBC QN AUTO: 29 PG (ref 26.8–34.3)
MCHC RBC AUTO-ENTMCNC: 32.3 G/DL (ref 31.4–37.4)
MCV RBC AUTO: 90 FL (ref 82–98)
PLATELET # BLD AUTO: 267 THOUSANDS/UL (ref 149–390)
PMV BLD AUTO: 10 FL (ref 8.9–12.7)
RBC # BLD AUTO: 3.65 MILLION/UL (ref 3.81–5.12)
WBC # BLD AUTO: 7.63 THOUSAND/UL (ref 4.31–10.16)

## 2024-10-17 PROCEDURE — 96360 HYDRATION IV INFUSION INIT: CPT

## 2024-10-17 PROCEDURE — 99214 OFFICE O/P EST MOD 30 MIN: CPT | Performed by: STUDENT IN AN ORGANIZED HEALTH CARE EDUCATION/TRAINING PROGRAM

## 2024-10-17 PROCEDURE — 93970 EXTREMITY STUDY: CPT | Performed by: SURGERY

## 2024-10-17 PROCEDURE — 93970 EXTREMITY STUDY: CPT

## 2024-10-17 PROCEDURE — 99214 OFFICE O/P EST MOD 30 MIN: CPT

## 2024-10-17 PROCEDURE — 76817 TRANSVAGINAL US OBSTETRIC: CPT

## 2024-10-17 PROCEDURE — 85027 COMPLETE CBC AUTOMATED: CPT

## 2024-10-17 PROCEDURE — 76817 TRANSVAGINAL US OBSTETRIC: CPT | Performed by: STUDENT IN AN ORGANIZED HEALTH CARE EDUCATION/TRAINING PROGRAM

## 2024-10-17 RX ADMIN — SODIUM CHLORIDE, SODIUM LACTATE, POTASSIUM CHLORIDE, AND CALCIUM CHLORIDE 1000 ML: .6; .31; .03; .02 INJECTION, SOLUTION INTRAVENOUS at 11:18

## 2024-10-17 NOTE — TELEPHONE ENCOUNTER
"Patient calling in stating that she's 33w0d , pt stating that she's been having contractions since 3pm yesterday 10/16/24, pt stating that she's having contractions every 10-15min. Pt stating that the pain is starting in the back and wraps around to her abdomen -5/10 and is experiencing nausea. Pt stating the fetal movement is +FM.   Pt denies leakage of fluid, vaginal bleeding, fevers, hand or facial swelling        Patient is advised to go to [facility] now for further evaluation, pt stating that she will be leaving now and it will take about 10 min to arrive.     Epic Secure Chat sent to Dr. Opal Murphy  Epic Secure Chat sent to Milena Andrade RN- Charge D Hanis           Reason for Disposition   Contractions < 10 minutes apart for 1 hour (i.e., 6 or more contractions an hour)    Answer Assessment - Initial Assessment Questions  1. ONSET: \"When did the symptoms begin?\"     3pm yesterday         2. CONTRACTIONS: \"Describe the contractions that you are having.\" (e.g., duration, frequency, regularity, severity)      q10-15 min, -5/10   3. CLARIBEL: \"What date are you expecting to deliver?\"      24  4. PARITY: \"Have you had a baby before?\" If Yes, ask: \"How long did the labor last?\"      Yes, pt stating that she was induced that failed and resulted in csection   5. FETAL MOVEMENT: \"Has the baby's movement decreased or changed significantly from normal?\"      Pt stating the fetal movement is +FM  6. OTHER SYMPTOMS: \"Do you have any other symptoms?\" (e.g., leaking fluid from vagina, fever, hand/facial swelling)      Pt denies leakage of fluid, vaginal bleeding, fevers, hand or facial swelling    Protocols used: Pregnancy - Labor - -ADULT-[place]    "

## 2024-10-17 NOTE — PROCEDURES
Natalya Rogelio, a  at 33w0d with an CLARIBEL of 2024, by Ultrasound, was seen at [facility] for the following procedure(s): $Procedure Type: US - Transvaginal]      Ultrasound Other  Fetal Presentation: Vertex  Cervical Length: 4.12  Funnel: No  Debris: No  Placenta Previa: No  Vasa Previa: No        Jennifer Santillan MD  10/17/24  11:06 AM

## 2024-10-17 NOTE — PROGRESS NOTES
L&D Triage Note - OB/GYN  Natalya Mercado 39 y.o. female MRN: [id number]  Unit/Bed#:  TRIAGE 2 Encounter: [id number]      ASSESSMENT:    Natalya Mercado is a 39 y.o.  at 33w0d who was evaluated today in triage due to contractions. Microscopy wnl, cervical length wnl, SVE 0/0/-3. Reassuring work up, the patient does not appear to be in  labor. While in triage, patient noted to be tachycardic, improved with IV fluids. Also noted SOB while supine and LLE swelling. Venous duplex wnl. Patient will continue to monitor shortness of breath and call if it worsens.    PLAN:    1) Contractions  - Speculum exam: no abnormal discharge, no pooling, no bleeding  - Microscopy wnl  - Cervical length via TVUS: 4.12cm  - SVE: 0/0/-3    2) Tachycardia  -  on arrival  - Given 1L bolus  - HR improved to normal following fluids  - Hgb 10.6, on oral iron and improved from 10.2     3) LLE swelling  - Dopplers wnl  - Patient counseled to call if her shortness of breath worsens in which case consider CT PE    4) 33 weeks gestation of pregnancy  - Continue routine prenatal care  - Discharge from OB triage with  labor precautions    - Reviewed rupture of membranes, false vs true labor, decreased fetal movement, and vaginal bleeding   - Pt to call provider with any concerns and follow up at her next scheduled prenatal appointment    - Case discussed with Dr. Murphy    SUBJECTIVE:    Natalya Mercado 39 y.o.  at 33w0d with an Estimated Date of Delivery: 24 presenting with contractions. Patient last had intercourse 2 days ago and contractions started yesterday. She was feeling them about 10-15 minutes apart, but since being to triage hasn't felt any. She has not noticed any abnormal discharge. She does not have a history of  labor or delivery.    This pregnancy has been notable for anemia, taking oral iron.    While patient was lying down for exam, felt short of breath and acid reflux made her  nauseous. She says she sleeps upright due to SOB with lying down.  She sometimes feels her heart racing but denies palpitations.    Contractions: endorses  Leakage of fluid: Denies  Vaginal Bleeding: Denies  Fetal movement: present    OBJECTIVE:    Vitals:    10/17/24 1041   BP:    Pulse: (!) 110   Resp:    Temp:    SpO2: 97%       ROS:  Constitutional: Negative  Respiratory: Shortness of breath while supine  Cardiovascular: Tachycardia    Gastrointestinal: Negative    General Physical Exam:  General: Well appearing, no distress  Respiratory: Unlabored breathing  Cardiovascular: Regular rate.  Abdomen: Soft, gravid, nontender  Fundal Height: Appropriate for gestational age.  Extremities: Warm and well perfused.  B/l LE swelling, left more swollen than right      Fetal monitoring:  Fetal heart rate: Baseline Rate (FHR): 135 bpm  Variability: Moderate  Accelerations: 15 x 15 or greater  Decelerations: None  Badin: Contraction Intensity: Mild      Cervical Exam  Speculum: Cervical os is closed. No abnormal discharge, no bleeding, no lesions, no pooling    KOH/WTMT:     Infection:   - no clue cells    - no hyphae   - no trichomonads present    Membrane status   - no ferning   - negative nitrazene   - no pooling       SVE: 0/0/-3    Imaging:       TVUS   - Cervical length    - 4.12 cm    - 4.03 cm    - 4.67 cm   - Presentation: cephalic          Jennifer Santillan MD  10/17/2024  11:07 AM

## 2024-10-24 ENCOUNTER — ROUTINE PRENATAL (OUTPATIENT)
Dept: OBGYN CLINIC | Facility: CLINIC | Age: 39
End: 2024-10-24
Payer: COMMERCIAL

## 2024-10-24 VITALS
HEIGHT: 66 IN | BODY MASS INDEX: 33.43 KG/M2 | DIASTOLIC BLOOD PRESSURE: 72 MMHG | WEIGHT: 208 LBS | SYSTOLIC BLOOD PRESSURE: 108 MMHG

## 2024-10-24 DIAGNOSIS — Z23 NEED FOR INFLUENZA VACCINATION: ICD-10-CM

## 2024-10-24 DIAGNOSIS — Z34.93 PRENATAL CARE IN THIRD TRIMESTER: ICD-10-CM

## 2024-10-24 DIAGNOSIS — Z3A.34 34 WEEKS GESTATION OF PREGNANCY: Primary | ICD-10-CM

## 2024-10-24 PROCEDURE — 90471 IMMUNIZATION ADMIN: CPT | Performed by: STUDENT IN AN ORGANIZED HEALTH CARE EDUCATION/TRAINING PROGRAM

## 2024-10-24 PROCEDURE — PNV: Performed by: STUDENT IN AN ORGANIZED HEALTH CARE EDUCATION/TRAINING PROGRAM

## 2024-10-24 PROCEDURE — 90656 IIV3 VACC NO PRSV 0.5 ML IM: CPT | Performed by: STUDENT IN AN ORGANIZED HEALTH CARE EDUCATION/TRAINING PROGRAM

## 2024-10-24 NOTE — ASSESSMENT & PLAN NOTE
Third tri labs notable for hgb 10.6  Rh status POS  Growth 9/19/24: 29w, VERTEX; 1449 grams - 3 lbs 3 oz (65%), AC 49th%ile  TDAP: received  Influenza: would like today  RSV: would like at next   Birth Plan: rCS scheduled for 12/03/2024, delivery consent in chart, would like TOLAC if spontaneous labor  Feeding: breast, pump previously ordered, received  Peds: Dr. Beavers in [place] (Peds)  Contraception: if repeat CS would like salpingectomy (private insurance)  Discussed pre-term labor precautions  Return to office in 2 weeks

## 2024-10-24 NOTE — PROGRESS NOTES
Natalya is a 39 y.o.  34w0d. Reports ++FM, no LOF, VB, or regular contractions.     Had been very uncomfortable, was very dehydrated  Normal now    Vitals:    10/24/24 1100   BP: 108/72     S=D  +FHTs  Assessment & Plan  34 weeks gestation of pregnancy  Third tri labs notable for hgb 10.6  Rh status POS  Growth 24: 29w, VERTEX; 1449 grams - 3 lbs 3 oz (65%), AC 49th%ile  TDAP: received  Influenza: would like today  RSV: would like at next   Birth Plan: rCS scheduled for 2024, delivery consent in chart, would like TOLAC if spontaneous labor  Feeding: breast, pump previously ordered, received  Peds: Dr. Beavers in [place] (Peds)  Contraception: if repeat CS would like salpingectomy (private insurance)  Discussed pre-term labor precautions  Return to office in 2 weeks         Prenatal care in third trimester

## 2024-11-05 PROBLEM — Z3A.36 36 WEEKS GESTATION OF PREGNANCY: Status: ACTIVE | Noted: 2024-10-08

## 2024-11-05 PROBLEM — O09.523 ELDERLY MULTIGRAVIDA IN THIRD TRIMESTER: Status: ACTIVE | Noted: 2024-05-08

## 2024-11-05 NOTE — PROGRESS NOTES
OB/GYN  PN Visit  Natalya Mercado  [id number]  2024  12:50 PM  NIMA Schuler    S: 39 y.o.  36w0d here for PN visit. Pregnancy complicated by hx of pre-e, AMA, hx of LTCS.     OB complaints:  Denies c/o n/v/ha, no edema, no smoking, no DV.   No vb/lof  No cramping/ctxns or signs of PTL.    She endorses good FM. She reports mild cramping.    O:    Pre- Vitals      Flowsheet Row Most Recent Value   Prenatal Assessment    Fetal Heart Rate 140   Fundal Height (cm) 37 cm   Movement Present   Prenatal Vitals    Blood Pressure 120/76   Weight - Scale 93.9 kg (207 lb)   Urine Albumin/Glucose    Dilation/Effacement/Station    Cervical Dilation 0   Fetal Station -3   Vaginal Drainage    Edema               Gen: no acute distress, nonlabored breathing.  OB exam completed: fundal height, +FHT.  Urine: -/-    A/P:  Problem List Items Addressed This Visit       Elderly multigravida in third trimester - Primary    Hx of preeclampsia, prior pregnancy, currently pregnant, second trimester    36 weeks gestation of pregnancy     Third tri labs notable for hgb 10.6  Rh status POS  Growth 24: 29w, VERTEX; 1449 grams - 3 lbs 3 oz (65%), AC 49th%ile  TDAP: received  Influenza: received   RSV: given today  Birth Plan: rCS scheduled for 2024, delivery consent in chart, would like TOLAC if spontaneous labor  Feeding: breast, pump previously ordered, received  Peds: Dr. Beavers in [place] (Peds)  Contraception: if repeat CS would like salpingectomy (private insurance)  Discussed pre-term labor precautions  GBS: collected today  Return to office in 1 weeks         Relevant Orders    Strep B DNA probe, amplification    Respiratory Syncytial Virus (RSV) vaccine (recombinant) (Abrysvo)    Irregular contractions             NIMA Schuler  2024  12:50 PM

## 2024-11-05 NOTE — ASSESSMENT & PLAN NOTE
Third tri labs notable for hgb 10.6  Rh status POS  Growth 9/19/24: 29w, VERTEX; 1449 grams - 3 lbs 3 oz (65%), AC 49th%ile  TDAP: received  Influenza: received   RSV: given today  Birth Plan: [facility] scheduled for 12/03/2024, delivery consent in chart, would like TOLAC if spontaneous labor  Feeding: breast, pump previously ordered, received  Peds: Dr. Beavers in [place] (Peds)  Contraception: if repeat CS would like salpingectomy (private insurance)  Discussed pre-term labor precautions  GBS: collected today  Return to office in 1 weeks

## 2024-11-07 ENCOUNTER — EVALUATION (OUTPATIENT)
Dept: PHYSICAL THERAPY | Facility: REHABILITATION | Age: 39
End: 2024-11-07
Payer: COMMERCIAL

## 2024-11-07 ENCOUNTER — ROUTINE PRENATAL (OUTPATIENT)
Dept: OBGYN CLINIC | Facility: CLINIC | Age: 39
End: 2024-11-07
Payer: COMMERCIAL

## 2024-11-07 VITALS
BODY MASS INDEX: 33.27 KG/M2 | HEIGHT: 66 IN | DIASTOLIC BLOOD PRESSURE: 76 MMHG | SYSTOLIC BLOOD PRESSURE: 120 MMHG | WEIGHT: 207 LBS

## 2024-11-07 DIAGNOSIS — Z3A.36 36 WEEKS GESTATION OF PREGNANCY: ICD-10-CM

## 2024-11-07 DIAGNOSIS — O09.523 ELDERLY MULTIGRAVIDA IN THIRD TRIMESTER: Primary | ICD-10-CM

## 2024-11-07 DIAGNOSIS — O26.899 PELVIC PAIN IN PREGNANCY: Primary | ICD-10-CM

## 2024-11-07 DIAGNOSIS — O47.9 IRREGULAR CONTRACTIONS: ICD-10-CM

## 2024-11-07 DIAGNOSIS — R10.2 PELVIC PAIN IN PREGNANCY: Primary | ICD-10-CM

## 2024-11-07 DIAGNOSIS — O09.292 HX OF PREECLAMPSIA, PRIOR PREGNANCY, CURRENTLY PREGNANT, SECOND TRIMESTER: ICD-10-CM

## 2024-11-07 PROCEDURE — 97161 PT EVAL LOW COMPLEX 20 MIN: CPT | Performed by: PHYSICAL THERAPIST

## 2024-11-07 PROCEDURE — PNV

## 2024-11-07 PROCEDURE — 97530 THERAPEUTIC ACTIVITIES: CPT | Performed by: PHYSICAL THERAPIST

## 2024-11-07 PROCEDURE — 87150 DNA/RNA AMPLIFIED PROBE: CPT

## 2024-11-07 PROCEDURE — 90678 RSV VACC PREF BIVALENT IM: CPT

## 2024-11-07 PROCEDURE — 97110 THERAPEUTIC EXERCISES: CPT | Performed by: PHYSICAL THERAPIST

## 2024-11-07 PROCEDURE — 90471 IMMUNIZATION ADMIN: CPT

## 2024-11-07 NOTE — PROGRESS NOTES
RSV vaccine administered in left deltoid per pt request. Pt tolerated well. Pt given VIS at time of administration. No previous reactions to any immunization.   Lila MCWILLIAMS

## 2024-11-11 LAB — GP B STREP DNA SPEC QL NAA+PROBE: NEGATIVE

## 2024-11-12 ENCOUNTER — OFFICE VISIT (OUTPATIENT)
Dept: PHYSICAL THERAPY | Facility: REHABILITATION | Age: 39
End: 2024-11-12
Payer: COMMERCIAL

## 2024-11-12 DIAGNOSIS — R10.2 PELVIC PAIN IN PREGNANCY: Primary | ICD-10-CM

## 2024-11-12 DIAGNOSIS — O26.899 PELVIC PAIN IN PREGNANCY: Primary | ICD-10-CM

## 2024-11-12 PROCEDURE — 97140 MANUAL THERAPY 1/> REGIONS: CPT

## 2024-11-12 PROCEDURE — 97110 THERAPEUTIC EXERCISES: CPT

## 2024-11-12 PROCEDURE — 97112 NEUROMUSCULAR REEDUCATION: CPT

## 2024-11-12 NOTE — ASSESSMENT & PLAN NOTE
Third tri labs notable for hgb 10.6  Rh status POS  Growth 9/19/24: 29w, VERTEX; 1449 grams - 3 lbs 3 oz (65%), AC 49th%ile  TDAP: received  Influenza: received   RSV: recieved  Birth Plan: [facility] scheduled for 12/03/2024, delivery consent in chart, would like TOLAC if spontaneous labor  Feeding: breast, pump previously ordered, received  Peds: Dr. Beavers in [place] (Peds)  Contraception: if repeat CS would like salpingectomy (private insurance)  Discussed pre-term labor precautions  GBS: negative  Return to office in 1 weeks

## 2024-11-12 NOTE — PROGRESS NOTES
OB/GYN  PN Visit  Natalya Mercado  [id number]  11/15/2024  10:36 AM  NIMA Schuler    S: 39 y.o.  37w1d here for PN visit. Pregnancy complicated by hx of pre-e, AMA, hx of LTCS.     OB complaints:  Denies c/o n/v/ha, no smoking, no DV.   No vb/lof  No cramping/ctxns or signs of PTL.    She endorses good FM. She reports increasing edema of hands and feet. She denies other PIH symptoms of headaches, visual changes, epigastric/abdominal pain.    O:    Pre- Vitals      Flowsheet Row Most Recent Value   Prenatal Assessment    Fetal Heart Rate 155   Fundal Height (cm) 38 cm   Movement Present   Prenatal Vitals    Blood Pressure 122/72   Weight - Scale 95.3 kg (210 lb)   Urine Albumin/Glucose    Dilation/Effacement/Station    Cervical Dilation 0   Fetal Station -3   Vaginal Drainage    Edema                 Gen: no acute distress, nonlabored breathing.  OB exam completed: fundal height, +FHT.  Urine: -/-    A/P:  Problem List Items Addressed This Visit       Elderly multigravida in third trimester    Hx of preeclampsia, prior pregnancy, currently pregnant, second trimester    36 weeks gestation of pregnancy - Primary    Third tri labs notable for hgb 10.6  Rh status POS  Growth 24: 29w, VERTEX; 1449 grams - 3 lbs 3 oz (65%), AC 49th%ile  TDAP: received  Influenza: received   RSV: recieved  Birth Plan: rCS scheduled for 2024, delivery consent in chart, would like TOLAC if spontaneous labor  Feeding: breast, pump previously ordered, received  Peds: Dr. Beavers in [place] (Peds)  Contraception: if repeat CS would like salpingectomy (private insurance)  Discussed pre-term labor precautions  GBS: negative  Return to office in 1 weeks         Irregular contractions             NIMA Schuler  11/15/2024  10:36 AM

## 2024-11-12 NOTE — PROGRESS NOTES
"Daily Note     Today's date: 2024  Patient name: Natalya Mercado  : 1985  MRN: [id number]  Referring provider: Alyssa Barrett*  Dx:   Encounter Diagnosis     ICD-10-CM    1. Pelvic pain in pregnancy  O26.899     R10.2           Start Time: 1045  Stop Time: 1135  Total time in clinic (min): 50 minutes    Subjective: Patient reports discomfort as about a 4-5/10 at start of session, reporting bilateral hip and groin discomfort. No complaints noted after previous session and compliance with HEP. Patient is 37 weeks pregnant this week.       Objective: See treatment diary below      Assessment: Tolerated treatment well. Patient demonstrated fatigue post treatment, exhibited good technique with therapeutic exercises, and would benefit from continued PT Initiated POC this session with good tolerance. No pain noted only reports of muscle fatigue, fatigues quickly. Demonstrates good form with TA and PFMC. Tenderness and tightness noted with STM to bilateral piriformis/glutes, right side more than left.       Plan: Continue per plan of care.  Progress treatment as tolerated.           Precautions: monitor vitals; watch supine hypotension,  scheduled 12/3 (has 9 yo daughter)      Qwilr HEP:  Daily Treatment Diary     Manual             Hip stretching             STM to SIJ, piriformis, glutes, hip flexors nv Done            SIJ Kinesiotaping                 NMR            Abdominal bracing: TA seated 10x 10x           PFMC nv 10x           TA + hip add isometrics nv 10x           TA + hip abd isometrics nv [place] TB 10x           Clamshells             TE             Hamstring stretch seated 3x10\" b/l 3x20'' b/l           Piriformis stretch seated 2' b/l 2' b/l           Pball pelvic circles **            Pball pelvic tilts **            Pball adductor stretch nv nv           TA + TB low row             TA + TB paloff press             TA + scap retractions             TA + " TB mid rows                          TA             Transfer training reviewed            Patient education Done - see assessment                                          Vitals             BP             HR             SPO2

## 2024-11-14 ENCOUNTER — OFFICE VISIT (OUTPATIENT)
Dept: PHYSICAL THERAPY | Facility: REHABILITATION | Age: 39
End: 2024-11-14
Payer: COMMERCIAL

## 2024-11-14 DIAGNOSIS — R10.2 PELVIC PAIN IN PREGNANCY: Primary | ICD-10-CM

## 2024-11-14 DIAGNOSIS — O26.899 PELVIC PAIN IN PREGNANCY: Primary | ICD-10-CM

## 2024-11-14 PROCEDURE — 97112 NEUROMUSCULAR REEDUCATION: CPT

## 2024-11-14 PROCEDURE — 97110 THERAPEUTIC EXERCISES: CPT

## 2024-11-14 PROCEDURE — 97140 MANUAL THERAPY 1/> REGIONS: CPT

## 2024-11-14 NOTE — PROGRESS NOTES
"Daily Note     Today's date: 2024  Patient name: Natalya Mercado  : 1985  MRN: [id number]  Referring provider: Alyssa Barrett*  Dx:   Encounter Diagnosis     ICD-10-CM    1. Pelvic pain in pregnancy  O26.899     R10.2           Start Time: 1215  Stop Time: 1315  Total time in clinic (min): 60 minutes    Subjective: Patient reports she felt relief after previous PT session, with some soreness noted the following day.       Objective: See treatment diary below      Assessment: Tolerated treatment well. Patient demonstrated fatigue post treatment, exhibited good technique with therapeutic exercises, and would benefit from continued PT Good tolerance to all TE performed, trialed pball exercise with patient tolerating well, reporting relief. Tenderness noted with manuals to bilateral hip musculature. Patient given updated HEP this session with patient reporting understanding.       Plan: Continue per plan of care.  Progress treatment as tolerated.       Precautions: monitor vitals; watch supine hypotension,  scheduled 12/3 (has 9 yo daughter)      Alex HEP:  Daily Treatment Diary     Manual            Hip stretching             STM to SIJ, piriformis, glutes, hip flexors nv Done  Done           SIJ Kinesiotaping                 NMR           Abdominal bracing: TA seated 10x 10x 10x          PFMC nv 10x 10x          TA + hip add isometrics nv 10x 10x          TA + hip abd isometrics nv [place] TB 10x Pink TB 10x          Clamshells             TE             Hamstring stretch seated 3x10\" b/l 3x20'' b/l 3x20'' b/l          Piriformis stretch seated 2' b/l 2' b/l 3x20'' b/l          Pball pelvic circles **  30x ea cw/ccw          Pball pelvic tilts **            Pball adductor stretch nv nv 10''x3 ea           TA + TB low row             TA + TB paloff press             TA + scap retractions             TA + TB mid rows                          TA           "   Transfer training reviewed            Patient education Done - see assessment                                          Vitals             BP             HR             SPO2

## 2024-11-15 ENCOUNTER — ROUTINE PRENATAL (OUTPATIENT)
Dept: OBGYN CLINIC | Facility: CLINIC | Age: 39
End: 2024-11-15

## 2024-11-15 VITALS
HEIGHT: 66 IN | DIASTOLIC BLOOD PRESSURE: 72 MMHG | BODY MASS INDEX: 33.75 KG/M2 | SYSTOLIC BLOOD PRESSURE: 122 MMHG | WEIGHT: 210 LBS

## 2024-11-15 DIAGNOSIS — O09.292 HX OF PREECLAMPSIA, PRIOR PREGNANCY, CURRENTLY PREGNANT, SECOND TRIMESTER: ICD-10-CM

## 2024-11-15 DIAGNOSIS — O47.9 IRREGULAR CONTRACTIONS: ICD-10-CM

## 2024-11-15 DIAGNOSIS — O09.523 ELDERLY MULTIGRAVIDA IN THIRD TRIMESTER: ICD-10-CM

## 2024-11-15 DIAGNOSIS — Z3A.37 37 WEEKS GESTATION OF PREGNANCY: Primary | ICD-10-CM

## 2024-11-15 PROCEDURE — PNV

## 2024-11-19 ENCOUNTER — TELEPHONE (OUTPATIENT)
Dept: OBGYN CLINIC | Facility: CLINIC | Age: 39
End: 2024-11-19

## 2024-11-19 NOTE — TELEPHONE ENCOUNTER
----- Message from Zunilda Blanca MD sent at 2024 12:22 PM EST -----  Regarding: c/s  Patient is scheduled for a possible repeat  for December 3 at 8 AM.  Dr. Lin is on-call that day on our current schedule please notified L&D and make sure all necessary parties are aware

## 2024-11-19 NOTE — TELEPHONE ENCOUNTER
Spoke with Dony DE LUNA ([facility] L&D) - patient on schedule for repeat C/S with BS - surgeon Dr Lin.  Staff message sent to Dr Lin.

## 2024-11-21 ENCOUNTER — OFFICE VISIT (OUTPATIENT)
Dept: PHYSICAL THERAPY | Facility: REHABILITATION | Age: 39
End: 2024-11-21
Payer: COMMERCIAL

## 2024-11-21 DIAGNOSIS — R10.2 PELVIC PAIN IN PREGNANCY: Primary | ICD-10-CM

## 2024-11-21 DIAGNOSIS — O26.899 PELVIC PAIN IN PREGNANCY: Primary | ICD-10-CM

## 2024-11-21 PROCEDURE — 97112 NEUROMUSCULAR REEDUCATION: CPT

## 2024-11-21 PROCEDURE — 97110 THERAPEUTIC EXERCISES: CPT

## 2024-11-21 PROCEDURE — 97140 MANUAL THERAPY 1/> REGIONS: CPT

## 2024-11-21 NOTE — PROGRESS NOTES
"Daily Note     Today's date: 2024  Patient name: Natalya Mercado  : 1985  MRN: [id number]  Referring provider: Alyssa Barrett*  Dx:   Encounter Diagnosis     ICD-10-CM    1. Pelvic pain in pregnancy  O26.899     R10.2           Start Time: 1118  Stop Time: 1212  Total time in clinic (min): 54 minutes    Subjective: Patient reports feeling uncomfortable and fatigued at start of session. She reports no complaints after previous session, relief noted. Patient is 38 weeks pregnant this week.       Objective: See treatment diary below      Assessment: Tolerated treatment well. Patient demonstrated fatigue post treatment, exhibited good technique with therapeutic exercises, and would benefit from continued PT Fatigues quickly with most TE. Relief noted with pball exercises and manuals today.       Plan: Continue per plan of care.  Progress treatment as tolerated.       Precautions: monitor vitals; watch supine hypotension,  scheduled 12/3 (has 9 yo daughter)      Alex HEP:  Daily Treatment Diary     Manual           Hip stretching             STM to SIJ, piriformis, glutes, hip flexors nv Done  Done  done         SIJ Kinesiotaping                 NMR          Abdominal bracing: TA seated 10x 10x 10x 10x + PFMC         PFMC nv 10x 10x          TA + hip add isometrics nv 10x 10x 10x         TA + hip abd isometrics nv [place] TB 10x Pink TB 10x Pink TB 10x         Clamshells             TE             Hamstring stretch seated 3x10\" b/l 3x20'' b/l 3x20'' b/l 3x20'' b/l         Piriformis stretch seated 2' b/l 2' b/l 3x20'' b/l Np uncomfortable         Pball pelvic circles **  30x ea cw/ccw 30x ea cw/ccw         Pball pelvic tilts **   20x         Pball adductor stretch nv nv 10''x3 ea  10''x3 ea         TA + TB low row             TA + TB paloff press             TA + scap retractions             TA + TB mid rows                          TA           "   Transfer training reviewed            Patient education Done - see assessment                                          Vitals             BP             HR             SPO2

## 2024-11-22 ENCOUNTER — ROUTINE PRENATAL (OUTPATIENT)
Dept: OBGYN CLINIC | Facility: CLINIC | Age: 39
End: 2024-11-22

## 2024-11-22 VITALS
SYSTOLIC BLOOD PRESSURE: 118 MMHG | BODY MASS INDEX: 34.13 KG/M2 | WEIGHT: 212.4 LBS | HEIGHT: 66 IN | DIASTOLIC BLOOD PRESSURE: 72 MMHG

## 2024-11-22 DIAGNOSIS — Z34.93 PRENATAL CARE IN THIRD TRIMESTER: Primary | ICD-10-CM

## 2024-11-22 PROCEDURE — PNV: Performed by: STUDENT IN AN ORGANIZED HEALTH CARE EDUCATION/TRAINING PROGRAM

## 2024-11-26 ENCOUNTER — OFFICE VISIT (OUTPATIENT)
Dept: PHYSICAL THERAPY | Facility: REHABILITATION | Age: 39
End: 2024-11-26
Payer: COMMERCIAL

## 2024-11-26 ENCOUNTER — ROUTINE PRENATAL (OUTPATIENT)
Dept: OBGYN CLINIC | Facility: CLINIC | Age: 39
End: 2024-11-26

## 2024-11-26 VITALS
DIASTOLIC BLOOD PRESSURE: 82 MMHG | HEIGHT: 66 IN | SYSTOLIC BLOOD PRESSURE: 124 MMHG | BODY MASS INDEX: 34.23 KG/M2 | WEIGHT: 213 LBS

## 2024-11-26 DIAGNOSIS — O47.9 IRREGULAR CONTRACTIONS: ICD-10-CM

## 2024-11-26 DIAGNOSIS — O26.899 PELVIC PAIN IN PREGNANCY: Primary | ICD-10-CM

## 2024-11-26 DIAGNOSIS — O09.523 ELDERLY MULTIGRAVIDA IN THIRD TRIMESTER: Primary | ICD-10-CM

## 2024-11-26 DIAGNOSIS — Z3A.38 38 WEEKS GESTATION OF PREGNANCY: ICD-10-CM

## 2024-11-26 DIAGNOSIS — O09.292 HX OF PREECLAMPSIA, PRIOR PREGNANCY, CURRENTLY PREGNANT, SECOND TRIMESTER: ICD-10-CM

## 2024-11-26 DIAGNOSIS — R10.2 PELVIC PAIN IN PREGNANCY: Primary | ICD-10-CM

## 2024-11-26 PROCEDURE — 97140 MANUAL THERAPY 1/> REGIONS: CPT | Performed by: PHYSICAL THERAPIST

## 2024-11-26 PROCEDURE — PNV: Performed by: STUDENT IN AN ORGANIZED HEALTH CARE EDUCATION/TRAINING PROGRAM

## 2024-11-26 PROCEDURE — 97110 THERAPEUTIC EXERCISES: CPT | Performed by: PHYSICAL THERAPIST

## 2024-11-26 NOTE — PROGRESS NOTES
39-year-old -0-0-1 at 38 weeks and 5 days presented for routine prenatal care-she does report increasing contractions and pelvic pressure.  Denies any loss of fluid or vaginal bleeding.  She does endorse good fetal movement.  Pregnancy is complicated by history of prior  delivery, preeclampsia in prior pregnancy, varicella equivocal, AMA.  Patient does desire Tolac prior to scheduled  delivery on 12/3/2024.  Cervix remains closed/-3 today.  Surgical soap and instructions reviewed.  Labor precautions reviewed-return to office in 1 week.

## 2024-11-26 NOTE — PROGRESS NOTES
"Daily Note     Today's date: 2024  Patient name: Natalya Mercado  : 1985  MRN: [id number]  Referring provider: Alyssa Barrett*  Dx:   Encounter Diagnosis     ICD-10-CM    1. Pelvic pain in pregnancy  O26.899     R10.2           Start Time: 1305  Stop Time: 1350  Total time in clinic (min): 45 minutes    Subjective: Patient is currently 39 weeks pregnant. She reports pelvic pressure but not as much pelvic pain. She reports majority of discomfort in her mid back today. She is scheduled for  on 12/3.      Objective: See treatment diary below      Assessment: Natalya has made the following improvements since beginning PT: decreased pain, increased postural control and awareness, and increased tolerance to activities. Natalya and PT mutually agree to transition to HEP at this time secondary to being scheduled for a  on 12/3. she has been given updated HEP with verbalized understanding and compliance. Natalya is encouraged to contact PT with any questions or concerns in the future and encouraged to return for postpartum evaluation.      Plan: Plan to discharge with comprehensive HEP for continued and maintained gains made in PT.       Precautions: monitor vitals; watch supine hypotension,  scheduled 12/3 (has 9 yo daughter)      Alex HEP:  Daily Treatment Diary     Manual          Hip stretching             STM to SIJ, piriformis, glutes, hip flexors nv Done  Done  done Done         SIJ Kinesiotaping                 NMR         Abdominal bracing: TA seated 10x 10x 10x 10x + PFMC 10x + PFMC        PFMC nv 10x 10x          TA + hip add isometrics nv 10x 10x 10x         TA + hip abd isometrics nv [place] TB 10x Pink TB 10x Pink TB 10x         Clamshells             TE             Hamstring stretch seated 3x10\" b/l 3x20'' b/l 3x20'' b/l 3x20'' b/l 3x20\" b/l        Piriformis stretch seated 2' b/l 2' b/l 3x20'' b/l Np " uncomfortable         Pball pelvic circles **  30x ea cw/ccw 30x ea cw/ccw         Pball pelvic tilts **   20x         Pball adductor stretch nv nv 10''x3 ea  10''x3 ea         TA + TB low row     Pink 10x        TA + TB paloff press             TA + scap retractions     Pink 10x        TA + TB mid rows     GTB 10x                     TA             Transfer training reviewed            Patient education Done - see assessment                                          Vitals             BP             HR             SPO2

## 2024-11-27 ENCOUNTER — HOSPITAL ENCOUNTER (INPATIENT)
Facility: HOSPITAL | Age: 39
LOS: 2 days | Discharge: HOME | End: 2024-11-29
Attending: STUDENT IN AN ORGANIZED HEALTH CARE EDUCATION/TRAINING PROGRAM | Admitting: STUDENT IN AN ORGANIZED HEALTH CARE EDUCATION/TRAINING PROGRAM
Payer: COMMERCIAL

## 2024-11-27 ENCOUNTER — NURSE TRIAGE (OUTPATIENT)
Age: 39
End: 2024-11-27

## 2024-11-27 ENCOUNTER — ANESTHESIA EVENT (INPATIENT)
Dept: LABOR AND DELIVERY | Facility: HOSPITAL | Age: 39
End: 2024-11-27
Payer: COMMERCIAL

## 2024-11-27 ENCOUNTER — ANESTHESIA (INPATIENT)
Dept: LABOR AND DELIVERY | Facility: HOSPITAL | Age: 39
End: 2024-11-27
Payer: COMMERCIAL

## 2024-11-27 DIAGNOSIS — O34.219 VBAC (VAGINAL BIRTH AFTER CESAREAN): Primary | ICD-10-CM

## 2024-11-27 PROBLEM — O42.90 PREMATURE RUPTURE OF MEMBRANES: Status: ACTIVE | Noted: 2024-11-27

## 2024-11-27 PROBLEM — Z3A.38 38 WEEKS GESTATION OF PREGNANCY: Status: ACTIVE | Noted: 2024-10-08

## 2024-11-27 LAB
ABO GROUP BLD: NORMAL
BLD GP AB SCN SERPL QL: NEGATIVE
ERYTHROCYTE [DISTWIDTH] IN BLOOD BY AUTOMATED COUNT: 13.8 % (ref 11.6–15.1)
HCT VFR BLD AUTO: 35.4 % (ref 34.8–46.1)
HGB BLD-MCNC: 11.6 G/DL (ref 11.5–15.4)
HOLD SPECIMEN: NORMAL
MCH RBC QN AUTO: 28.9 PG (ref 26.8–34.3)
MCHC RBC AUTO-ENTMCNC: 32.8 G/DL (ref 31.4–37.4)
MCV RBC AUTO: 88 FL (ref 82–98)
PLATELET # BLD AUTO: 213 THOUSANDS/UL (ref 149–390)
PMV BLD AUTO: 11.2 FL (ref 8.9–12.7)
RBC # BLD AUTO: 4.01 MILLION/UL (ref 3.81–5.12)
RH BLD: POSITIVE
SPECIMEN EXPIRATION DATE: NORMAL
TREPONEMA PALLIDUM IGG+IGM AB [PRESENCE] IN SERUM OR PLASMA BY IMMUNOASSAY: NORMAL
WBC # BLD AUTO: 9.08 THOUSAND/UL (ref 4.31–10.16)

## 2024-11-27 PROCEDURE — 85027 COMPLETE CBC AUTOMATED: CPT

## 2024-11-27 PROCEDURE — 99213 OFFICE O/P EST LOW 20 MIN: CPT

## 2024-11-27 PROCEDURE — 86901 BLOOD TYPING SEROLOGIC RH(D): CPT

## 2024-11-27 PROCEDURE — 86900 BLOOD TYPING SEROLOGIC ABO: CPT

## 2024-11-27 PROCEDURE — 86850 RBC ANTIBODY SCREEN: CPT

## 2024-11-27 PROCEDURE — 86780 TREPONEMA PALLIDUM: CPT

## 2024-11-27 PROCEDURE — NC001 PR NO CHARGE: Performed by: STUDENT IN AN ORGANIZED HEALTH CARE EDUCATION/TRAINING PROGRAM

## 2024-11-27 RX ORDER — ROPIVACAINE HYDROCHLORIDE 2 MG/ML
INJECTION, SOLUTION EPIDURAL; INFILTRATION; PERINEURAL CONTINUOUS PRN
Status: DISCONTINUED | OUTPATIENT
Start: 2024-11-27 | End: 2024-12-01 | Stop reason: HOSPADM

## 2024-11-27 RX ORDER — DIPHENHYDRAMINE HYDROCHLORIDE 50 MG/ML
12.5 INJECTION INTRAMUSCULAR; INTRAVENOUS EVERY 6 HOURS PRN
Status: DISCONTINUED | OUTPATIENT
Start: 2024-11-27 | End: 2024-11-28

## 2024-11-27 RX ORDER — BUPIVACAINE HYDROCHLORIDE 2.5 MG/ML
30 INJECTION, SOLUTION EPIDURAL; INFILTRATION; INTRACAUDAL ONCE AS NEEDED
Status: DISCONTINUED | OUTPATIENT
Start: 2024-11-27 | End: 2024-11-28

## 2024-11-27 RX ORDER — LIDOCAINE HYDROCHLORIDE AND EPINEPHRINE 15; 5 MG/ML; UG/ML
INJECTION, SOLUTION EPIDURAL
Status: COMPLETED | OUTPATIENT
Start: 2024-11-27 | End: 2024-11-27

## 2024-11-27 RX ORDER — SODIUM CHLORIDE, SODIUM LACTATE, POTASSIUM CHLORIDE, CALCIUM CHLORIDE 600; 310; 30; 20 MG/100ML; MG/100ML; MG/100ML; MG/100ML
125 INJECTION, SOLUTION INTRAVENOUS CONTINUOUS
Status: DISCONTINUED | OUTPATIENT
Start: 2024-11-27 | End: 2024-11-28

## 2024-11-27 RX ORDER — ROPIVACAINE HYDROCHLORIDE 2 MG/ML
INJECTION, SOLUTION EPIDURAL; INFILTRATION; PERINEURAL AS NEEDED
Status: DISCONTINUED | OUTPATIENT
Start: 2024-11-27 | End: 2024-12-01 | Stop reason: HOSPADM

## 2024-11-27 RX ORDER — OXYTOCIN/RINGER'S LACTATE 30/500 ML
1-30 PLASTIC BAG, INJECTION (ML) INTRAVENOUS
Status: DISCONTINUED | OUTPATIENT
Start: 2024-11-27 | End: 2024-11-28

## 2024-11-27 RX ADMIN — ROPIVACAINE HYDROCHLORIDE 5 ML: 2 INJECTION, SOLUTION EPIDURAL; INFILTRATION; PERINEURAL at 21:07

## 2024-11-27 RX ADMIN — ROPIVACAINE HYDROCHLORIDE: 2 INJECTION, SOLUTION EPIDURAL; INFILTRATION at 21:15

## 2024-11-27 RX ADMIN — SODIUM CHLORIDE, SODIUM LACTATE, POTASSIUM CHLORIDE, AND CALCIUM CHLORIDE 125 ML/HR: .6; .31; .03; .02 INJECTION, SOLUTION INTRAVENOUS at 20:06

## 2024-11-27 RX ADMIN — OXYTOCIN 2 MILLI-UNITS/MIN: 10 INJECTION INTRAVENOUS at 22:15

## 2024-11-27 RX ADMIN — SODIUM CHLORIDE, SODIUM LACTATE, POTASSIUM CHLORIDE, AND CALCIUM CHLORIDE 125 ML/HR: .6; .31; .03; .02 INJECTION, SOLUTION INTRAVENOUS at 18:12

## 2024-11-27 RX ADMIN — ROPIVACAINE HYDROCHLORIDE 10 ML/HR: 2 INJECTION, SOLUTION EPIDURAL; INFILTRATION at 21:06

## 2024-11-27 RX ADMIN — LIDOCAINE HYDROCHLORIDE AND EPINEPHRINE 3 ML: 15; 5 INJECTION, SOLUTION EPIDURAL at 21:04

## 2024-11-27 NOTE — TELEPHONE ENCOUNTER
"38w6d,  OB patient called in reporting a large gush of fluid 20 mins ago - had a contraction and then felt a pop- still slowly leaking- clear fluid. No VB, Good FM, contractions occurring every 5 mins or so. Scheduled for repeat C/S on . Advised patient she should present to L&D for eval. Patient agreeable, will head over to L&D [facility] now.   Charge nurse at [facility] notified.     ESC sent to Dr Lin.   Reason for Disposition   Leakage of fluid from vagina    Answer Assessment - Initial Assessment Questions  1. ONSET: \"When did the symptoms begin?\"         20 mins ago  2. CONTRACTIONS: \"Describe the contractions that you are having.\" (e.g., duration, frequency, regularity, severity)      Yes- every 5 mins  3. PREGNANCY: \"How many weeks pregnant are you?\"      38w6d  4. CLARIBEL: \"What date are you expecting to deliver?\"     24 C/S  5. PARITY: \"Have you had a baby before?\" If Yes, ask: \"How long did the labor last?\"        6. FETAL MOVEMENT: \"Has the baby's movement decreased or changed significantly from normal?\"      Good FM  7. OTHER SYMPTOMS: \"Do you have any other symptoms?\" (e.g., leaking fluid from vagina, vaginal bleeding, fever, hand/facial swelling)      ROM, CX    Protocols used: Pregnancy - Labor-Adult-[place]    "

## 2024-11-27 NOTE — H&P
H & P- Obstetrics   Natalya Mercado 39 y.o. female MRN: [id number]  Unit/Bed#: LD TRIAGE 1- Encounter: [id number]    Assessment: 39 y.o.  at 38w6d admitted for spontaneous rupture of membranes vs PROM.  SVE: /-3  FHT: 140 bpm, moderate variability, 15 x 15 accelerations present, no decelerations   Clinical EFW: 65% ; Cephalic confirmed by palpation  GBS status: neg   Postpartum contraception plan: bilateral salpingectomy     Plan:   Anemia  Assessment & Plan  History of anemia with PTA Hgb 10.6  Follow-up admission CBC    38 weeks gestation of pregnancy  Assessment & Plan  Cha IUP  Prenatal labs reviewed  Admitted in the setting of rupture of membranes    Hx of preeclampsia, prior pregnancy, currently pregnant, second trimester  Assessment & Plan  Normotensive on admission  Trend pressures per protocol     * Premature rupture of membranes  Assessment & Plan  Admit   T&S, CBC, RPR  CLD  IV fluids  GBS prophylaxis is not needed  Plan for cervical exam in 2-4h, if unchanged will plan for pitocin infusion            Discussed case and plan w/ Dr. Lin       Chief Complaint: leaking fluid and contractions    HPI: Natalya Mercado is a 39 y.o.  with an CLARIBEL of 2024, by Ultrasound at 38w6d who is being admitted for spontaneous rupture of membranes vs premature rupture of membranes. She complains of uterine contractions, occurring every few minutes, has large amounts of fluid leaking, and reports no VB. She states she has felt good FM.    Patient Active Problem List   Diagnosis    Anxiety    Attention deficit hyperactivity disorder (ADHD), predominantly inattentive type    Elderly multigravida in third trimester    Hx of preeclampsia, prior pregnancy, currently pregnant, second trimester    Vitamin D deficiency    38 weeks gestation of pregnancy    Irregular contractions    Anemia    Premature rupture of membranes       Baby complications/comments: none    Review of Systems    Constitutional:  Negative for chills and fever.   HENT:  Negative for congestion, rhinorrhea, sneezing and sore throat.    Eyes:  Negative for photophobia and visual disturbance.   Respiratory:  Negative for cough and shortness of breath.    Cardiovascular:  Negative for chest pain.   Gastrointestinal:  Negative for diarrhea, nausea and vomiting.   Genitourinary:  Negative for dysuria and frequency.   Neurological:  Negative for dizziness, numbness and headaches.   Psychiatric/Behavioral: Negative.         OB Hx:  OB History    Para Term  AB Living   2 1 1 0 0 1   SAB IAB Ectopic Multiple Live Births   0 0 0 0 1      # Outcome Date GA Lbr Julio/2nd Weight Sex Type Anes PTL Lv   2 Current            1 Term 16 39w4d  3275 g (7 lb 3.5 oz) F CS-LTranv EPI N BAUDILIO      Complications: Failure to Progress in Second Stage, Severe pre-eclampsia       Past Medical Hx:  Past Medical History:   Diagnosis Date    Abnormal Pap smear of cervix     teens, and colpo with DR Toth    Depression     Hypertension     preeclampsia severe    Migraine     Varicella     as a child       Past Surgical hx:  Past Surgical History:   Procedure Laterality Date     SECTION      COLPOSCOPY      approx 2006    TYMPANOSTOMY TUBE PLACEMENT         Social Hx:  Alcohol use: denies  Tobacco use: denies  Other substance use: denies      Allergies   Allergen Reactions    Penicillins Anaphylaxis    Iodine - Food Allergy GI Intolerance     vomiting         Medications Prior to Admission:     Cholecalciferol 125 MCG (5000 UT) capsule    Ferrous Sulfate (Iron) 325 (65 Fe) MG TABS    Prenatal MV & Min w/FA-DHA (PRENATAL GUMMIES PO)    Objective:  HR:  [88] 88  BP: (132)/(74) 132/74  There is no height or weight on file to calculate BMI.     Physical Exam:  Physical Exam  Constitutional:       General: She is not in acute distress.     Appearance: Normal appearance. She is not ill-appearing.   Genitourinary:      Genitourinary  Comments: Patient sitting in large puddle of clear fluid  Nitrazine positive   No membranes palpated on exam   SVE as above   HENT:      Head: Normocephalic and atraumatic.      Mouth/Throat:      Mouth: Mucous membranes are moist. No oral lesions.   Eyes:      General: No scleral icterus.     Extraocular Movements: Extraocular movements intact.   Cardiovascular:      Rate and Rhythm: Normal rate and regular rhythm.      Pulses: Normal pulses.   Pulmonary:      Effort: Pulmonary effort is normal. No respiratory distress.   Abdominal:      Palpations: Abdomen is soft.      Tenderness: There is no abdominal tenderness.   Musculoskeletal:      Right lower leg: No edema.      Left lower leg: No edema.   Neurological:      General: No focal deficit present.      Mental Status: She is alert.   Skin:     General: Skin is warm and dry.   Psychiatric:         Attention and Perception: Attention normal.         Mood and Affect: Mood normal.         Speech: Speech normal.         Behavior: Behavior normal.         Thought Content: Thought content normal.         Judgment: Judgment normal.   Vitals reviewed. Exam conducted with a chaperone present.            FHT:  140 bpm, moderate variability, 15 x 15 accelerations at times, no decelerations    TOCO:   Contractions every 1-3 minutes     Lab Results   Component Value Date    WBC 7.63 10/17/2024    HGB 10.6 (L) 10/17/2024    HCT 32.8 (L) 10/17/2024     10/17/2024     Lab Results   Component Value Date    K 3.6 04/26/2024     04/26/2024    CO2 25 04/26/2024    BUN 9 04/26/2024    CREATININE 0.52 (L) 04/26/2024    AST 15 04/26/2024    ALT 13 04/26/2024     Prenatal Labs: Reviewed      Blood type: O pos  Antibody: neg  GBS: neg  HIV: NR  Rubella: immune  Syphilis IgM/IgG: NR  HBsAg: NR  HCAb: NR  Chlamydia: neg  Gonorrhea: neg  Diabetes 1 hour screen: wnl  3 hour glucose: n/a  Platelets: f/u admission CBC  Hgb: f/u admission CBC  >2 Midnights  INPATIENT      Signature/Title: Emerita Aguilar MD  Date: 11/27/2024  Time: 5:33 PM

## 2024-11-27 NOTE — PLAN OF CARE
Problem: PAIN - ADULT  Goal: Verbalizes/displays adequate comfort level or baseline comfort level  Description: Interventions:  - Encourage patient to monitor pain and request assistance  - Assess pain using appropriate pain scale  - Administer analgesics based on type and severity of pain and evaluate response  - Implement non-pharmacological measures as appropriate and evaluate response  - Consider cultural and social influences on pain and pain management  - Notify physician/advanced practitioner if interventions unsuccessful or patient reports new pain  Outcome: Progressing     Problem: INFECTION - ADULT  Goal: Absence or prevention of progression during hospitalization  Description: INTERVENTIONS:  - Assess and monitor for signs and symptoms of infection  - Monitor lab/diagnostic results  - Monitor all insertion sites, i.e. indwelling lines, tubes, and drains  - Monitor endotracheal if appropriate and nasal secretions for changes in amount and color  - [place] appropriate cooling/warming therapies per order  - Administer medications as ordered  - Instruct and encourage patient and family to use good hand hygiene technique  - Identify and instruct in appropriate isolation precautions for identified infection/condition  Outcome: Progressing  Goal: Absence of fever/infection during neutropenic period  Description: INTERVENTIONS:  - Monitor WBC    Outcome: Progressing     Problem: SAFETY ADULT  Goal: Patient will remain free of falls  Description: INTERVENTIONS:  - Educate patient/family on patient safety including physical limitations  - Instruct patient to call for assistance with activity   - Consult OT/PT to assist with strengthening/mobility   - Keep Call bell within reach  - Keep bed low and locked with side rails adjusted as appropriate  - Keep care items and personal belongings within reach  - Initiate and maintain comfort rounds  - Apply yellow socks and bracelet for high fall risk patients  - Consider  moving patient to room near nurses station  Outcome: Progressing     Problem: Knowledge Deficit  Goal: Patient/family/caregiver demonstrates understanding of disease process, treatment plan, medications, and discharge instructions  Description: Complete learning assessment and assess knowledge base.  Interventions:  - Provide teaching at level of understanding  - Provide teaching via preferred learning methods  Outcome: Progressing     Problem: DISCHARGE PLANNING  Goal: Discharge to home or other facility with appropriate resources  Description: INTERVENTIONS:  - Identify barriers to discharge w/patient and caregiver  - Arrange for needed discharge resources and transportation as appropriate  - Identify discharge learning needs (meds, wound care, etc.)  - Arrange for interpretive services to assist at discharge as needed  - Refer to Case Management Department for coordinating discharge planning if the patient needs post-hospital services based on physician/advanced practitioner order or complex needs related to functional status, cognitive ability, or social support system  Outcome: Progressing

## 2024-11-27 NOTE — ASSESSMENT & PLAN NOTE
Continue routine post partum care  Pain well controlled: tylenol/motrin scheduled  Lochia within normal limits: continue to monitor   OOB: as able, encourage ambulation  Passing flatus  Voiding spontaneously  Breastfeeding/Bottlefeeding

## 2024-11-28 PROBLEM — O34.219 VBAC (VAGINAL BIRTH AFTER CESAREAN): Status: ACTIVE | Noted: 2024-11-27

## 2024-11-28 LAB
BASE EXCESS BLDCOA CALC-SCNC: -11.2 MMOL/L (ref 3–11)
BASE EXCESS BLDCOV CALC-SCNC: -6 MMOL/L (ref 1–9)
HCO3 BLDCOA-SCNC: 17.7 MMOL/L (ref 17.3–27.3)
HCO3 BLDCOV-SCNC: 20.4 MMOL/L (ref 12.2–28.6)
O2 CT VFR BLDCOA CALC: 10.8 ML/DL
OXYHGB MFR BLDCOA: 55.4 %
OXYHGB MFR BLDCOV: 48.3 %
PCO2 BLDCOA: 51.7 MM[HG] (ref 30–60)
PCO2 BLDCOV: 43.9 MM HG (ref 27–43)
PH BLDCOA: 7.15 [PH] (ref 7.23–7.43)
PH BLDCOV: 7.29 [PH] (ref 7.19–7.49)
PO2 BLDCOA: 33 MM HG (ref 5–25)
PO2 BLDCOV: 24.7 MM HG (ref 15–45)
SAO2 % BLDCOV: 8.7 ML/DL

## 2024-11-28 PROCEDURE — 82805 BLOOD GASES W/O2 SATURATION: CPT | Performed by: STUDENT IN AN ORGANIZED HEALTH CARE EDUCATION/TRAINING PROGRAM

## 2024-11-28 PROCEDURE — 3E033VJ INTRODUCTION OF OTHER HORMONE INTO PERIPHERAL VEIN, PERCUTANEOUS APPROACH: ICD-10-PCS | Performed by: STUDENT IN AN ORGANIZED HEALTH CARE EDUCATION/TRAINING PROGRAM

## 2024-11-28 RX ORDER — OXYTOCIN/RINGER'S LACTATE 30/500 ML
250 PLASTIC BAG, INJECTION (ML) INTRAVENOUS ONCE
Status: COMPLETED | OUTPATIENT
Start: 2024-11-28 | End: 2024-11-28

## 2024-11-28 RX ORDER — BENZOCAINE/MENTHOL 6 MG-10 MG
1 LOZENGE MUCOUS MEMBRANE DAILY PRN
Status: DISCONTINUED | OUTPATIENT
Start: 2024-11-28 | End: 2024-11-29 | Stop reason: HOSPADM

## 2024-11-28 RX ORDER — IBUPROFEN 600 MG/1
600 TABLET, FILM COATED ORAL EVERY 6 HOURS PRN
Status: DISCONTINUED | OUTPATIENT
Start: 2024-11-28 | End: 2024-11-29 | Stop reason: HOSPADM

## 2024-11-28 RX ORDER — DOCUSATE SODIUM 100 MG/1
100 CAPSULE, LIQUID FILLED ORAL 2 TIMES DAILY
Status: DISCONTINUED | OUTPATIENT
Start: 2024-11-28 | End: 2024-11-29 | Stop reason: HOSPADM

## 2024-11-28 RX ORDER — ACETAMINOPHEN 325 MG/1
975 TABLET ORAL EVERY 6 HOURS PRN
Status: DISCONTINUED | OUTPATIENT
Start: 2024-11-28 | End: 2024-11-29 | Stop reason: HOSPADM

## 2024-11-28 RX ORDER — ONDANSETRON 2 MG/ML
4 INJECTION INTRAMUSCULAR; INTRAVENOUS EVERY 6 HOURS PRN
Status: DISCONTINUED | OUTPATIENT
Start: 2024-11-28 | End: 2024-11-28

## 2024-11-28 RX ORDER — CALCIUM CARBONATE 500 MG/1
1000 TABLET, CHEWABLE ORAL DAILY PRN
Status: DISCONTINUED | OUTPATIENT
Start: 2024-11-28 | End: 2024-11-29 | Stop reason: HOSPADM

## 2024-11-28 RX ADMIN — SODIUM CHLORIDE, SODIUM LACTATE, POTASSIUM CHLORIDE, AND CALCIUM CHLORIDE 125 ML/HR: .6; .31; .03; .02 INJECTION, SOLUTION INTRAVENOUS at 04:13

## 2024-11-28 RX ADMIN — DOCUSATE SODIUM 100 MG: 100 CAPSULE, LIQUID FILLED ORAL at 17:25

## 2024-11-28 RX ADMIN — ONDANSETRON 4 MG: 2 INJECTION, SOLUTION INTRAMUSCULAR; INTRAVENOUS at 12:19

## 2024-11-28 RX ADMIN — ROPIVACAINE HYDROCHLORIDE: 2 INJECTION, SOLUTION EPIDURAL; INFILTRATION at 10:06

## 2024-11-28 RX ADMIN — IBUPROFEN 600 MG: 600 TABLET ORAL at 15:03

## 2024-11-28 RX ADMIN — ROPIVACAINE HYDROCHLORIDE: 2 INJECTION, SOLUTION EPIDURAL; INFILTRATION at 04:26

## 2024-11-28 RX ADMIN — SODIUM CHLORIDE, SODIUM LACTATE, POTASSIUM CHLORIDE, AND CALCIUM CHLORIDE 125 ML/HR: .6; .31; .03; .02 INJECTION, SOLUTION INTRAVENOUS at 06:21

## 2024-11-28 RX ADMIN — OXYTOCIN 250 MILLI-UNITS/MIN: 10 INJECTION INTRAVENOUS at 14:24

## 2024-11-28 RX ADMIN — BENZOCAINE AND LEVOMENTHOL 1 APPLICATION: 200; 5 SPRAY TOPICAL at 15:03

## 2024-11-28 RX ADMIN — SODIUM CHLORIDE, SODIUM LACTATE, POTASSIUM CHLORIDE, AND CALCIUM CHLORIDE 999 ML/HR: .6; .31; .03; .02 INJECTION, SOLUTION INTRAVENOUS at 12:23

## 2024-11-28 RX ADMIN — IBUPROFEN 600 MG: 600 TABLET ORAL at 20:51

## 2024-11-28 RX ADMIN — ACETAMINOPHEN 975 MG: 325 TABLET, FILM COATED ORAL at 18:19

## 2024-11-28 NOTE — OB LABOR/OXYTOCIN SAFETY PROGRESS
Labor Progress Note - Natalya Mercado 39 y.o. female MRN: [id number]    Unit/Bed#: -01 Encounter: [id number]       Contraction Frequency (minutes): 2-5  Contraction Intensity: Mild  Uterine Activity Characteristics: Irregular  Cervical Dilation: 1  Cervical Effacement: 60  Fetal Station: -3  Baseline Rate (FHR): 140 bpm  Fetal Heart Rate (FHT): 140 BPM  FHR Category:1     Vital Signs:   Vitals:    24 213   BP: 115/65   Pulse: 104   Resp:    Temp:    SpO2:        Notes/comments:   Patient remains relatively unchanged from last exam-reviewed recommendations for induction with Pitocin if desires versus repeat  delivery.  Patient has been well counseled on risks of [place] and we reviewed induction agents and how induction typically proceeds-patient is amendable to induction of labor with Pitocin titration per protocol.  Continue to monitor closely and reassess in 2 hours or sooner if needed.    Piper Lin MD 2024 9:54 PM

## 2024-11-28 NOTE — OB LABOR/OXYTOCIN SAFETY PROGRESS
Oxytocin Safety Progress Check Note - Natalya Mercado 39 y.o. female MRN: [id number]    Unit/Bed#: -01 Encounter: [id number]    Dose (caren-units/min) Oxytocin: 10 caren-units/min  Contraction Frequency (minutes): 1.5-2  Contraction Intensity: Moderate  Uterine Activity Characteristics: Irregular  Cervical Dilation: 3        Cervical Effacement: 80  Fetal Station: -2  Baseline Rate (FHR): 145 bpm  Fetal Heart Rate (FHT): 148 BPM  FHR Category: 2  Oxytocin Safety Progress Check: Safety check completed            Vital Signs:   Vitals:    11/28/24 0331   BP: 106/64   Pulse: (!) 107   Resp: 16   Temp: 98.1 °F (36.7 °C)   SpO2:        Notes/comments:   Patient resting comfortably.  FHT category 2 with intermittent lates in the setting of moderate variability.  Maternal repositioning ongoing currently.  SVE 3/80/-2.  Contractions every 2-3 min on toco.  Pit running at 10, continue titrating to contractions.  Will administer IVF bolus if lates do not improve with maternal repositioning.  Will recheck in 2-4 hr or sooner if clinically indicated.      Rita Rockwell MD 11/28/2024 4:10 AM

## 2024-11-28 NOTE — OB LABOR/OXYTOCIN SAFETY PROGRESS
Oxytocin Safety Progress Check Note - Natalya Mercado 39 y.o. female MRN: [id number]    Unit/Bed#: -01 Encounter: [id number]    Dose (caren-units/min) Oxytocin: 10 caren-units/min  Contraction Frequency (minutes): 1.5-2  Contraction Intensity: Moderate  Uterine Activity Characteristics: Irregular  Cervical Dilation: 3        Cervical Effacement: 80  Fetal Station: -2  Baseline Rate (FHR): 145 bpm  Fetal Heart Rate (FHT): 148 BPM  FHR Category: 2  Oxytocin Safety Progress Check: Safety check completed            Vital Signs:   Vitals:    11/28/24 0331   BP: 106/64   Pulse: (!) 107   Resp: 16   Temp: 98.1 °F (36.7 °C)   SpO2:        Notes/comments:   Patient repositioned to right lateral decubitus with subsequent worsening of late decelerations.  Will try a new position.  Will also administer 500 mL IVF bolus and decrease Pitocin from 10 to 6 then begin titrating up again with return of category 1 FHT.        Rita Rockwell MD 11/28/2024 4:17 AM

## 2024-11-28 NOTE — ANESTHESIA POSTPROCEDURE EVALUATION
Post-Op Assessment Note    CV Status:  Stable  Pain Score: 0    Pain management: adequate      Post-op block assessment: catheter intact, no complications and site cleaned   Mental Status:  Alert and awake   Hydration Status:  Euvolemic   PONV Controlled:  Controlled   Airway Patency:  Patent     Post Op Vitals Reviewed: Yes    No anethesia notable event occurred.    Staff: CRNA   Comments: Epidural removed, tip intact, site cleaned        Last Filed PACU Vitals:  Vitals Value Taken Time   Temp     Pulse     BP     Resp     SpO2         Modified [place]:  No data recorded

## 2024-11-28 NOTE — OB LABOR/OXYTOCIN SAFETY PROGRESS
Oxytocin Safety Progress Check Note - Natalya Mercado 39 y.o. female MRN: [id number]    Unit/Bed#: -01 Encounter: [id number]    Dose (caren-units/min) Oxytocin: 8 caren-units/min  Contraction Frequency (minutes): 1.5-3  Contraction Intensity: Moderate  Uterine Activity Characteristics: Regular  Cervical Dilation: 4-5        Cervical Effacement: 80  Fetal Station: -1  Baseline Rate (FHR): 140 bpm  Fetal Heart Rate (FHT): 142 BPM  FHR Category: 2  Oxytocin Safety Progress Check: Safety check completed            Vital Signs:   Vitals:    11/28/24 0903   BP: 121/77   Pulse: 97   Resp:    Temp:    SpO2:        Notes/comments:   SVE as noted above. Tracing cat 2, with intermittent variable deceleration noted, but with moderate variability and presence of accelerations. Ctx q2-3 mins on pit of 8. Dr. Baer made aware.     Eliceo Zeng MD 11/28/2024 9:11 AM

## 2024-11-28 NOTE — ANESTHESIA PREPROCEDURE EVALUATION
Procedure:  LABOR ANALGESIA    Relevant Problems   GYN   (+) 38 weeks gestation of pregnancy   (+) Elderly multigravida in third trimester      HEMATOLOGY   (+) Anemia      NEURO/PSYCH   (+) Anxiety      Behavioral Health   (+) Attention deficit hyperactivity disorder (ADHD), predominantly inattentive type      Obstetrics/Gynecology   (+) Hx of preeclampsia, prior pregnancy, currently pregnant, second trimester   (+) Premature rupture of membranes      Other   (+) Vitamin D deficiency        Physical Exam    Airway    Mallampati score: I         Dental   No notable dental hx     Cardiovascular  Rhythm: regular, Rate: normal, Cardiovascular exam normal    Pulmonary  Pulmonary exam normal Breath sounds clear to auscultation    Other Findings  post-pubertal.      Anesthesia Plan  ASA Score- 3     Anesthesia Type- epidural with ASA Monitors.         Additional Monitors:     Airway Plan:            Plan Factors-Exercise tolerance (METS): >4 METS.    Chart reviewed.   Existing labs reviewed. Patient summary reviewed.    Patient is not a current smoker.              Induction-     Postoperative Plan-     Perioperative Resuscitation Plan - Level 1 - Full Code.       Informed Consent- Anesthetic plan and risks discussed with patient.

## 2024-11-28 NOTE — PLAN OF CARE
Problem: PAIN - ADULT  Goal: Verbalizes/displays adequate comfort level or baseline comfort level  Description: Interventions:  - Encourage patient to monitor pain and request assistance  - Assess pain using appropriate pain scale  - Administer analgesics based on type and severity of pain and evaluate response  - Implement non-pharmacological measures as appropriate and evaluate response  - Consider cultural and social influences on pain and pain management  - Notify physician/advanced practitioner if interventions unsuccessful or patient reports new pain  11/28/2024 1456 by Arlet Malik RN  Outcome: Progressing  11/28/2024 0657 by Arlet Malik RN  Outcome: Progressing     Problem: INFECTION - ADULT  Goal: Absence or prevention of progression during hospitalization  Description: INTERVENTIONS:  - Assess and monitor for signs and symptoms of infection  - Monitor lab/diagnostic results  - Monitor all insertion sites, i.e. indwelling lines, tubes, and drains  - Monitor endotracheal if appropriate and nasal secretions for changes in amount and color  - [place] appropriate cooling/warming therapies per order  - Administer medications as ordered  - Instruct and encourage patient and family to use good hand hygiene technique  - Identify and instruct in appropriate isolation precautions for identified infection/condition  11/28/2024 1456 by rAlet Malik RN  Outcome: Progressing  11/28/2024 0657 by Arlet Malik RN  Outcome: Progressing  Goal: Absence of fever/infection during neutropenic period  Description: INTERVENTIONS:  - Monitor WBC    11/28/2024 1456 by Arlet Malik RN  Outcome: Progressing  11/28/2024 0657 by Arlet Malik RN  Outcome: Progressing     Problem: SAFETY ADULT  Goal: Patient will remain free of falls  Description: INTERVENTIONS:  - Educate patient/family on patient safety including physical limitations  - Instruct patient to call for assistance with activity   - Consult OT/PT to assist with  strengthening/mobility   - Keep Call bell within reach  - Keep bed low and locked with side rails adjusted as appropriate  - Keep care items and personal belongings within reach  - Initiate and maintain comfort rounds  - Apply yellow socks and bracelet for high fall risk patients  - Consider moving patient to room near nurses station  2024 by Arlet Malik RN  Outcome: Progressing  2024 by Arlet Malik RN  Outcome: Progressing     Problem: DISCHARGE PLANNING  Goal: Discharge to home or other facility with appropriate resources  Description: INTERVENTIONS:  - Identify barriers to discharge w/patient and caregiver  - Arrange for needed discharge resources and transportation as appropriate  - Identify discharge learning needs (meds, wound care, etc.)  - Arrange for interpretive services to assist at discharge as needed  - Refer to Case Management Department for coordinating discharge planning if the patient needs post-hospital services based on physician/advanced practitioner order or complex needs related to functional status, cognitive ability, or social support system  2024 by Arlet Malik RN  Outcome: Progressing  2024 by Arlet Malik RN  Outcome: Progressing     Problem: POSTPARTUM  Goal: Experiences normal postpartum course  Description: INTERVENTIONS:  - Monitor maternal vital signs  - Assess uterine involution and lochia  Outcome: Progressing  Goal: Appropriate maternal -  bonding  Description: INTERVENTIONS:  - Identify family support  - Assess for appropriate maternal/infant bonding   -Encourage maternal/infant bonding opportunities  - Referral to  or  as needed  Outcome: Progressing  Goal: Establishment of infant feeding pattern  Description: INTERVENTIONS:  - Assess breast/bottle feeding  - Refer to lactation as needed  Outcome: Progressing  Goal: Incision(s), wounds(s) or drain site(s) healing without S/S of infection  Description:  INTERVENTIONS  - Assess and document dressing, incision, wound bed, drain sites and surrounding tissue  - Provide patient and family education  - Perform skin care/dressing changes every  Outcome: Progressing

## 2024-11-28 NOTE — L&D DELIVERY NOTE
Vaginal Delivery Summary - OB/GYN   Natalya Mercado 39 y.o. female MRN: [id number]  Unit/Bed#: -01 Encounter: [id number]          Predelivery Diagnosis:  1. Pregnancy at 39w0d    2. PROM    Postdelivery Diagnosis:  1. Same as above  2. Delivery of term     Procedure: Spontaneous Vaginal Delivery, Vaginal birth after     Attending: Divya Baer MD     Assistant: NIKOLAS    Anesthesia: PCEA         Complications: none apparent    Specimens: cord blood, arterial and venous cord blood gasses, placenta to storage    Findings:   1. Delivery of viable male at 1423, with APGARS of 8 and 9 at 1 and 5 minutes respectively  2.  Spontaneous delivery of intact placenta with centrally- inserted 3 vessel cord at 1428  3.   Umbilical Cord Venous Blood Gas:  Results from last 7 days   Lab Units 24  1432   PH COV  7.286   PCO2 COV mm HG 43.9*   HCO3 COV mmol/L 20.4   BASE EXC COV mmol/L -6.0*   O2 CT CD VB mL/dL 8.7   O2 HGB, VENOUS CORD % 48.3     Umbilical Cord Arterial Blood Gas:  Results from last 7 days   Lab Units 24  1432   PH COA  7.152*   PCO2 COA  51.7   PO2 COA mm HG 33.0*   HCO3 COA mmol/L 17.7   BASE EXC COA mmol/L -11.2*   O2 CONTENT CORD ART ml/dl 10.8   O2 HGB, ARTERIAL CORD % 55.4     4. No perineal laceration    Brief history and labor course:  Ms. Natalya Mercado is a 39 y.o.  at 39w0d. She presented to labor and delivery complaining of leakage of fluid. After leakage of amniotic fluid was confirmed she was admitted for expectant management. She was started on pitocin and called completely dilated at 1345.    Description of procedure    After pushing for 31 minutes, at 1423 patient delivered a viable male , wt pending, apgars of 8 (1 min) and 9 (5 min). The fetal vertex delivered spontaneously in ELSA position. Baby was checked for nuchal and none was found. The anterior right shoulder delivered atraumatically with maternal expulsive forces and the assistance of  gentle downward traction. The posterior left shoulder delivered with maternal expulsive forces and the assistance of gentle upward traction. The remainder of the fetus delivered spontaneously.     Upon delivery, the infant was placed on the mothers abdomen and the cord was clamped and cut. Delayed cord clamping was performed. There was no evidence for injury to the . Awaiting nurse resuscitators evaluated the . Arterial and venous cord blood gases and cord blood was collected for analysis. These were promptly sent to the lab. In the immediate post-partum, 30 units of IV pitocin was administered, and the uterus was noted to contract down well with massage and pitocin. The placenta delivered spontaneously at 1428 and was noted to have a centrally inserted 3 vessel cord.     The vagina, cervix, perineum, and rectum were inspected and there was noted to be no perineal tears.    At the conclusion of the procedure, all needle, sponge, and instrument counts were noted to be correct. Patient tolerated the procedure well and was allowed to recover in labor and delivery room with family and  before being transferred to the post-partum floor. The attending was present and participated in all key portions of the case.    Divya Baer MD   24   2:47 PM

## 2024-11-28 NOTE — PLAN OF CARE
Problem: PAIN - ADULT  Goal: Verbalizes/displays adequate comfort level or baseline comfort level  Description: Interventions:  - Encourage patient to monitor pain and request assistance  - Assess pain using appropriate pain scale  - Administer analgesics based on type and severity of pain and evaluate response  - Implement non-pharmacological measures as appropriate and evaluate response  - Consider cultural and social influences on pain and pain management  - Notify physician/advanced practitioner if interventions unsuccessful or patient reports new pain  Outcome: Progressing     Problem: INFECTION - ADULT  Goal: Absence or prevention of progression during hospitalization  Description: INTERVENTIONS:  - Assess and monitor for signs and symptoms of infection  - Monitor lab/diagnostic results  - Monitor all insertion sites, i.e. indwelling lines, tubes, and drains  - Monitor endotracheal if appropriate and nasal secretions for changes in amount and color  - [place] appropriate cooling/warming therapies per order  - Administer medications as ordered  - Instruct and encourage patient and family to use good hand hygiene technique  - Identify and instruct in appropriate isolation precautions for identified infection/condition  Outcome: Progressing  Goal: Absence of fever/infection during neutropenic period  Description: INTERVENTIONS:  - Monitor WBC    Outcome: Progressing     Problem: SAFETY ADULT  Goal: Patient will remain free of falls  Description: INTERVENTIONS:  - Educate patient/family on patient safety including physical limitations  - Instruct patient to call for assistance with activity   - Consult OT/PT to assist with strengthening/mobility   - Keep Call bell within reach  - Keep bed low and locked with side rails adjusted as appropriate  - Keep care items and personal belongings within reach  - Initiate and maintain comfort rounds  - Apply yellow socks and bracelet for high fall risk patients  - Consider  moving patient to room near nurses station  Outcome: Progressing     Problem: Knowledge Deficit  Goal: Patient/family/caregiver demonstrates understanding of disease process, treatment plan, medications, and discharge instructions  Description: Complete learning assessment and assess knowledge base.  Interventions:  - Provide teaching at level of understanding  - Provide teaching via preferred learning methods  Outcome: Progressing  Goal: Verbalizes understanding of labor plan  Description: Assess patient/family/caregiver's baseline knowledge level and ability to understand information.  Provide education via patient/family/caregiver's preferred learning method at appropriate level of understanding.     1. Provide teaching at level of understanding.  2. Provide teaching via preferred learning method(s).  Outcome: Progressing     Problem: DISCHARGE PLANNING  Goal: Discharge to home or other facility with appropriate resources  Description: INTERVENTIONS:  - Identify barriers to discharge w/patient and caregiver  - Arrange for needed discharge resources and transportation as appropriate  - Identify discharge learning needs (meds, wound care, etc.)  - Arrange for interpretive services to assist at discharge as needed  - Refer to Case Management Department for coordinating discharge planning if the patient needs post-hospital services based on physician/advanced practitioner order or complex needs related to functional status, cognitive ability, or social support system  Outcome: Progressing     Problem: Labor & Delivery  Goal: Manages discomfort  Description: Assess and monitor for signs and symptoms of discomfort.  Assess patient's pain level regularly and per hospital policy.  Administer medications as ordered. Support use of nonpharmacological methods to help control pain such as distraction, imagery, relaxation, and application of heat and cold.  Collaborate with interdisciplinary team and patient to determine  appropriate pain management plan.    1. Include patient in decisions related to comfort.  2. Offer non-pharmacological pain management interventions.  3. Report ineffective pain management to physician.  Outcome: Progressing  Goal: Patient vital signs are stable  Description: 1. Assess vital signs - vaginal delivery.  Outcome: Progressing

## 2024-11-28 NOTE — OB LABOR/OXYTOCIN SAFETY PROGRESS
Oxytocin Safety Progress Check Note - Natalya Mercado 39 y.o. female MRN: [id number]    Unit/Bed#: -01 Encounter: [id number]    Dose (craen-units/min) Oxytocin: 10 caren-units/min  Contraction Frequency (minutes): 2-3  Contraction Intensity: Moderate  Uterine Activity Characteristics: Regular  Cervical Dilation: 1-2        Cervical Effacement: 80  Fetal Station: -3  Baseline Rate (FHR): 140 bpm  Fetal Heart Rate (FHT): 136 BPM  FHR Category: 1-2 for intermittent minimal variability and periodically decelerations that improved with repositioning-there is overall moderate variability with 15 x 15 accelerations noted.  Oxytocin Safety Progress Check: Safety check completed     Vital Signs:   Vitals:    11/28/24 0030   BP: 102/60   Pulse: 96   Resp: 16   Temp: 98.6 °F (37 °C)   SpO2:        Notes/comments:   Patient feeling contractions more strongly-administered epidural bolus at bedside  Cervical exam as above  Continue Pitocin titration per protocol and monitor closely.  Reassess in 2 hours or sooner if needed.      Piper Lin MD 11/28/2024 2:09 AM

## 2024-11-28 NOTE — ANESTHESIA PROCEDURE NOTES
Epidural Block    Patient location during procedure: OB/L&D  Start time: 11/27/2024 9:04 PM  Reason for block: procedure for pain  Staffing  Performed by: Santy Palacio CRNA  Authorized by: Bert Doyle MD    Preanesthetic Checklist  Completed: patient identified, IV checked, site marked, risks and benefits discussed, surgical consent, monitors and equipment checked, pre-op evaluation and timeout performed  Epidural  Patient position: sitting  Prep: ChloraPrep  Sedation Level: no sedation  Patient monitoring: frequent blood pressure checks, continuous pulse oximetry and heart rate  Approach: midline  Location: lumbar, L2-3  Injection technique: NICO saline  Needle  Needle type: Tuohy   Needle gauge: 18 G  Needle insertion depth: 6 cm  Catheter type: multi-orifice  Catheter size: 20 G  Catheter at skin depth: 12 cm  Catheter securement method: stabilization device and clear occlusive dressing  Test dose: negativelidocaine-epinephrine (XYLOCAINE-MPF/EPINEPHRINE) 1.5 %-1:200,000 injection 3 mL - Epidural   3 mL - 11/27/2024 9:04:00 PM  Assessment  Sensory level: T10  Number of attempts: 1negative aspiration for CSF, negative aspiration for heme and no paresthesia on injection  patient tolerated the procedure well with no immediate complications  Additional Notes  Epidural placed with one attempt. No complications noted. Negative test dose. Pt hemodynamically stable. After PCEA pump was set up and drip was running, Epidural level was accessed with cold wet solution prior to leaving pt room. Analgesia level is adequate for this stage of labor. Pt states they are comfortable.

## 2024-11-28 NOTE — PLAN OF CARE
Problem: PAIN - ADULT  Goal: Verbalizes/displays adequate comfort level or baseline comfort level  Description: Interventions:  - Encourage patient to monitor pain and request assistance  - Assess pain using appropriate pain scale  - Administer analgesics based on type and severity of pain and evaluate response  - Implement non-pharmacological measures as appropriate and evaluate response  - Consider cultural and social influences on pain and pain management  - Notify physician/advanced practitioner if interventions unsuccessful or patient reports new pain  Outcome: Progressing     Problem: INFECTION - ADULT  Goal: Absence or prevention of progression during hospitalization  Description: INTERVENTIONS:  - Assess and monitor for signs and symptoms of infection  - Monitor lab/diagnostic results  - Monitor all insertion sites, i.e. indwelling lines, tubes, and drains  - Monitor endotracheal if appropriate and nasal secretions for changes in amount and color  - [place] appropriate cooling/warming therapies per order  - Administer medications as ordered  - Instruct and encourage patient and family to use good hand hygiene technique  - Identify and instruct in appropriate isolation precautions for identified infection/condition  Outcome: Progressing  Goal: Absence of fever/infection during neutropenic period  Description: INTERVENTIONS:  - Monitor WBC    Outcome: Progressing     Problem: SAFETY ADULT  Goal: Patient will remain free of falls  Description: INTERVENTIONS:  - Educate patient/family on patient safety including physical limitations  - Instruct patient to call for assistance with activity   - Consult OT/PT to assist with strengthening/mobility   - Keep Call bell within reach  - Keep bed low and locked with side rails adjusted as appropriate  - Keep care items and personal belongings within reach  - Initiate and maintain comfort rounds  - Apply yellow socks and bracelet for high fall risk patients  - Consider  moving patient to room near nurses station  Outcome: Progressing     Problem: Knowledge Deficit  Goal: Patient/family/caregiver demonstrates understanding of disease process, treatment plan, medications, and discharge instructions  Description: Complete learning assessment and assess knowledge base.  Interventions:  - Provide teaching at level of understanding  - Provide teaching via preferred learning methods  Outcome: Progressing  Goal: Verbalizes understanding of labor plan  Description: Assess patient/family/caregiver's baseline knowledge level and ability to understand information.  Provide education via patient/family/caregiver's preferred learning method at appropriate level of understanding.     1. Provide teaching at level of understanding.  2. Provide teaching via preferred learning method(s).  Outcome: Progressing     Problem: DISCHARGE PLANNING  Goal: Discharge to home or other facility with appropriate resources  Description: INTERVENTIONS:  - Identify barriers to discharge w/patient and caregiver  - Arrange for needed discharge resources and transportation as appropriate  - Identify discharge learning needs (meds, wound care, etc.)  - Arrange for interpretive services to assist at discharge as needed  - Refer to Case Management Department for coordinating discharge planning if the patient needs post-hospital services based on physician/advanced practitioner order or complex needs related to functional status, cognitive ability, or social support system  Outcome: Progressing     Problem: Labor & Delivery  Goal: Manages discomfort  Description: Assess and monitor for signs and symptoms of discomfort.  Assess patient's pain level regularly and per hospital policy.  Administer medications as ordered. Support use of nonpharmacological methods to help control pain such as distraction, imagery, relaxation, and application of heat and cold.  Collaborate with interdisciplinary team and patient to determine  appropriate pain management plan.    1. Include patient in decisions related to comfort.  2. Offer non-pharmacological pain management interventions.  3. Report ineffective pain management to physician.  Outcome: Progressing  Goal: Patient vital signs are stable  Description: 1. Assess vital signs - vaginal delivery.  Outcome: Progressing

## 2024-11-29 VITALS
BODY MASS INDEX: 34.23 KG/M2 | HEIGHT: 66 IN | WEIGHT: 213 LBS | HEART RATE: 86 BPM | OXYGEN SATURATION: 98 % | RESPIRATION RATE: 18 BRPM | SYSTOLIC BLOOD PRESSURE: 116 MMHG | TEMPERATURE: 97.6 F | DIASTOLIC BLOOD PRESSURE: 71 MMHG

## 2024-11-29 PROCEDURE — NC001 PR NO CHARGE: Performed by: STUDENT IN AN ORGANIZED HEALTH CARE EDUCATION/TRAINING PROGRAM

## 2024-11-29 PROCEDURE — 99024 POSTOP FOLLOW-UP VISIT: CPT | Performed by: STUDENT IN AN ORGANIZED HEALTH CARE EDUCATION/TRAINING PROGRAM

## 2024-11-29 RX ORDER — BENZOCAINE/MENTHOL 6 MG-10 MG
1 LOZENGE MUCOUS MEMBRANE DAILY PRN
Start: 2024-11-29

## 2024-11-29 RX ORDER — ACETAMINOPHEN 325 MG/1
975 TABLET ORAL EVERY 6 HOURS PRN
Qty: 30 TABLET | Refills: 0 | Status: SHIPPED | OUTPATIENT
Start: 2024-11-29

## 2024-11-29 RX ORDER — IBUPROFEN 600 MG/1
600 TABLET, FILM COATED ORAL EVERY 6 HOURS PRN
Qty: 30 TABLET | Refills: 0 | Status: SHIPPED | OUTPATIENT
Start: 2024-11-29

## 2024-11-29 RX ADMIN — IBUPROFEN 600 MG: 600 TABLET ORAL at 10:57

## 2024-11-29 RX ADMIN — IBUPROFEN 600 MG: 600 TABLET ORAL at 04:43

## 2024-11-29 RX ADMIN — HYDROCORTISONE 1 APPLICATION: 1 CREAM TOPICAL at 09:13

## 2024-11-29 RX ADMIN — IBUPROFEN 600 MG: 600 TABLET ORAL at 17:04

## 2024-11-29 RX ADMIN — ACETAMINOPHEN 975 MG: 325 TABLET, FILM COATED ORAL at 00:50

## 2024-11-29 RX ADMIN — DOCUSATE SODIUM 100 MG: 100 CAPSULE, LIQUID FILLED ORAL at 17:05

## 2024-11-29 RX ADMIN — DOCUSATE SODIUM 100 MG: 100 CAPSULE, LIQUID FILLED ORAL at 08:49

## 2024-11-29 NOTE — PROGRESS NOTES
"Progress Note - OB/GYN  Natalya Mercado 39 y.o. female MRN: [id number]  Unit/Bed#:  320-01 Encounter: [id number]    Assessment and Plan     Natalya Mercado is a patient of: [facility] . She is PPD# 1 s/p  vaginal birth after  ()  Recovering well and is stable       Anemia  Assessment & Plan  History of anemia with PTA Hgb 10.6  Admission CBC 11.6    Hx of preeclampsia, prior pregnancy, currently pregnant, second trimester  Assessment & Plan  Normotensive on admission  Trend pressures per protocol     *  (vaginal birth after )  Assessment & Plan       Continue routine post partum care  Pain well controlled: tylenol/motrin scheduled  Lochia within normal limits: continue to monitor   OOB: as able, encourage ambulation  Passing flatus  Voiding spontaneously  Breastfeeding/Bottlefeeding            Disposition    - Anticipate discharge home on PPD# 1-2      Subjective/Objective     Chief Complaint: Postpartum State     Subjective:    Natalya Mercado is PPD/POD#1 s/p  vaginal birth after  (). She has no current complaints.  Pain is well controlled.  Patient is currently voiding.  She is ambulating.  Patient is currently passing flatus and has had no bowel movement. She is tolerating PO, and denies nausea or vomitting. Patient denies fever, chills, chest pain, shortness of breath, or calf tenderness. Lochia is minimal. She is  Breastfeeding. She is recovering well and is stable.       Vitals:   /65 (BP Location: Left arm)   Pulse 83   Temp 98.1 °F (36.7 °C) (Oral)   Resp 18   Ht 5' 5.98\" (1.676 m)   Wt 96.6 kg (213 lb)   LMP 2024 (Exact Date)   SpO2 97%   Breastfeeding Yes   BMI 34.40 kg/m²       Intake/Output Summary (Last 24 hours) at 2024 0606  Last data filed at 2024 1515  Gross per 24 hour   Intake --   Output 1804 ml   Net -1804 ml       Invasive Devices       Peripheral Intravenous Line  Duration             Peripheral IV 24 " Right;Ventral (anterior) Forearm 1 day                    Physical Exam:   GEN: Natalya Mercado appears well, alert and oriented x 3, pleasant and cooperative   CARDIO: RRR, no murmurs or rubs  RESP:  CTAB, no wheezes or rales  ABDOMEN: soft, no tenderness, no distention, fundus firm below umbilicus, Incision C/D/I  EXTREMITIES: SCDs on, non tender, no erythema, b/l Juan's sign negative      Labs:     Hemoglobin   Date Value Ref Range Status   11/27/2024 11.6 11.5 - 15.4 g/dL Final   10/17/2024 10.6 (L) 11.5 - 15.4 g/dL Final     WBC   Date Value Ref Range Status   11/27/2024 9.08 4.31 - 10.16 Thousand/uL Final   10/17/2024 7.63 4.31 - 10.16 Thousand/uL Final     Platelets   Date Value Ref Range Status   11/27/2024 213 149 - 390 Thousands/uL Final   10/17/2024 267 149 - 390 Thousands/uL Final     Creatinine   Date Value Ref Range Status   04/26/2024 0.52 (L) 0.60 - 1.30 mg/dL Final     Comment:     Standardized to IDMS reference method   12/08/2021 0.69 0.40 - 1.10 mg/dL Final   09/30/2020 0.81 0.40 - 1.10 mg/dL Final     AST   Date Value Ref Range Status   04/26/2024 15 13 - 39 U/L Final   12/08/2021 10 <41 U/L Final   09/30/2020 <6 <41 U/L Final     ALT   Date Value Ref Range Status   04/26/2024 13 7 - 52 U/L Final     Comment:     Specimen collection should occur prior to Sulfasalazine administration due to the potential for falsely depressed results.    12/08/2021 27 <56 U/L Final   09/30/2020 18 <56 U/L Final          Darcy Frank MD  11/29/2024  6:06 AM

## 2024-11-29 NOTE — PLAN OF CARE
Problem: PAIN - ADULT  Goal: Verbalizes/displays adequate comfort level or baseline comfort level  Description: Interventions:  - Encourage patient to monitor pain and request assistance  - Assess pain using appropriate pain scale  - Administer analgesics based on type and severity of pain and evaluate response  - Implement non-pharmacological measures as appropriate and evaluate response  - Consider cultural and social influences on pain and pain management  - Notify physician/advanced practitioner if interventions unsuccessful or patient reports new pain  Outcome: Progressing     Problem: INFECTION - ADULT  Goal: Absence or prevention of progression during hospitalization  Description: INTERVENTIONS:  - Assess and monitor for signs and symptoms of infection  - Monitor lab/diagnostic results  - Monitor all insertion sites, i.e. indwelling lines, tubes, and drains  - Monitor endotracheal if appropriate and nasal secretions for changes in amount and color  - [place] appropriate cooling/warming therapies per order  - Administer medications as ordered  - Instruct and encourage patient and family to use good hand hygiene technique  - Identify and instruct in appropriate isolation precautions for identified infection/condition  Outcome: Progressing  Goal: Absence of fever/infection during neutropenic period  Description: INTERVENTIONS:  - Monitor WBC    Outcome: Progressing     Problem: SAFETY ADULT  Goal: Patient will remain free of falls  Description: INTERVENTIONS:  - Educate patient/family on patient safety including physical limitations  - Instruct patient to call for assistance with activity   - Consult OT/PT to assist with strengthening/mobility   - Keep Call bell within reach  - Keep bed low and locked with side rails adjusted as appropriate  - Keep care items and personal belongings within reach  - Initiate and maintain comfort rounds  - Apply yellow socks and bracelet for high fall risk patients  - Consider  moving patient to room near nurses station  Outcome: Progressing     Problem: DISCHARGE PLANNING  Goal: Discharge to home or other facility with appropriate resources  Description: INTERVENTIONS:  - Identify barriers to discharge w/patient and caregiver  - Arrange for needed discharge resources and transportation as appropriate  - Identify discharge learning needs (meds, wound care, etc.)  - Arrange for interpretive services to assist at discharge as needed  - Refer to Case Management Department for coordinating discharge planning if the patient needs post-hospital services based on physician/advanced practitioner order or complex needs related to functional status, cognitive ability, or social support system  Outcome: Progressing     Problem: POSTPARTUM  Goal: Experiences normal postpartum course  Description: INTERVENTIONS:  - Monitor maternal vital signs  - Assess uterine involution and lochia  Outcome: Progressing  Goal: Appropriate maternal -  bonding  Description: INTERVENTIONS:  - Identify family support  - Assess for appropriate maternal/infant bonding   -Encourage maternal/infant bonding opportunities  - Referral to  or  as needed  Outcome: Progressing  Goal: Establishment of infant feeding pattern  Description: INTERVENTIONS:  - Assess breast/bottle feeding  - Refer to lactation as needed  Outcome: Progressing  Goal: Incision(s), wounds(s) or drain site(s) healing without S/S of infection  Description: INTERVENTIONS  - Assess and document dressing, incision, wound bed, drain sites and surrounding tissue  - Provide patient and family education  - Perform skin care/dressing changes every   Outcome: Progressing

## 2024-11-29 NOTE — DISCHARGE SUMMARY
Discharge Summary - OB/GYN  Natalya Mercado 39 y.o. female MRN: [id number]  Unit/Bed#: -01 Encounter: [id number]    Admission Date: 2024     Discharge Date: 2024    Admitting Attending: Divya Baer MD    Delivering Attending: Dr. Baer    Discharging Attending: Dr. Murphy    Principal Diagnosis: Pregnancy at 39w0d  Hx of preeclampsia  Hx of anemia  Varicella equivocal    Secondary Diagnosis:   1. Same, delivered    Procedures: vaginal birth after  ()    Anesthesia: epidural    Hospital course: Ms. Natalya Mercado is a 39 y.o.  at 39w0d. She presented to labor and delivery complaining of leakage of fluid. After leakage of amniotic fluid was confirmed she was admitted for expectant management. She was started on pitocin and called completely dilated at 1345.     She delivered a viable male  on 2024 at 1423. Weight 8lbs 2.2oz via normal spontaneous vaginal delivery. Apgars were 8 (1 min) and 9 (5 min).  was transferred to  nursery. Patient tolerated the procedure well.     Her post-delivery course was uncomplicated. Her postpartum pain was well controlled with oral analgesics.    On day of discharge, she was ambulating and able to reasonably perform all ADLs. She was voiding and had appropriate bowel function. Pain was well controlled. She was discharged home on postpartum day #1 without complications. Patient was instructed to follow up with her OB as an outpatient and was given appropriate warnings to call provider if she develops signs of infection or uncontrolled pain.    Complications: none apparent    Condition at discharge: good     Discharge instructions/Information to patient and family:   See after visit summary for information provided to patient and family.      Provisions for Follow-Up Care:  See after visit summary for information related to follow-up care and any pertinent home health orders.      Disposition: See After Visit  Summary for discharge disposition information.    Planned Readmission: No    Discharge medications and instructions:   Discharge instructions/Information to patient and family:   -Do not place anything (no partner, tampons or douche) in your vagina for 6 weeks.  -You may walk for exercise for the first 6 weeks then gradually return to your usual activities.   -Please do not drive for 1 week if you have no stitches and for 2 weeks if you have stitches or underwent a  delivery.    -You may take baths or shower per your preference.   -Please look at your bust (breasts) in the mirror daily and call for redness or tenderness or increased warmth.   -Please call us for temperature > 100.4*F or 38* C, worsening pain or a foul discharge.      Discharge Medications:   Prenatal vitamin daily for 6 months or the duration of nursing whichever is longer.  Motrin 600 mg orally every 6 hours as needed for pain  Tylenol (over the counter) per bottle directions as needed for pain: do NOT use with percocet  Hydrocortisone cream 1% (over the counter) applied 1-2x daily to hemorrhoids as needed

## 2024-12-01 NOTE — ANESTHESIA POSTPROCEDURE EVALUATION
Post-Op Assessment Note    CV Status:  Stable    Pain management: adequate       Mental Status:  Awake   Hydration Status:  Euvolemic   PONV Controlled:  Controlled   Airway Patency:  Patent     Post Op Vitals Reviewed: Yes    No anethesia notable event occurred.    Staff: Anesthesiologist           Last Filed PACU Vitals:  Vitals Value Taken Time   Temp     Pulse     BP     Resp     SpO2         Modified Carmen:  No data recorded

## 2024-12-02 ENCOUNTER — TELEPHONE (OUTPATIENT)
Dept: OBGYN CLINIC | Facility: CLINIC | Age: 39
End: 2024-12-02

## 2024-12-05 LAB — PLACENTA IN STORAGE: NORMAL

## 2024-12-06 NOTE — TELEPHONE ENCOUNTER
"Spoke with patient to complete postpartum assessment.   Patient reports some discomfort such as some heaviness in pelvis, if on feet for prolonged time. Will improve with rest and if needed takes motrin for discomfort that is effective. Reviewed activity precautions and if worsening or changes in symptoms to call office.  Pt reports regular BM and urination. Breastfeeding and reports going well. Has been able to schedule/complete pediatrician appt for baby. Emotionally doing well, reports \"feeling weepy at times for no reason but once realized no reason then feels better. Not like sadness\" per pt. Reports having support at home and aware if needed to contact office for support/resources.   Pt denies any questions or concerns at this time, confirmed f/u pp appt as scheduled in office.  "

## 2024-12-06 NOTE — TELEPHONE ENCOUNTER
"POSTPARTUM PHONE CALL ASSESSMENT    Date of Delivery: 24  Delivering Provider: Dr. Baer  Mode:      Delivery Notes/Complications: none noted   Do you still have bleeding/pain? If so, how much/how severe? VB lessening, pink, not changing pad often just changes for hygiene purposes. \"Discomfort if up\"... and then call went silent.   Regular BMs/Urination?   Breastfeeding/Formula/Both?   How are you doing emotionally?   Do you have any other questions or concerns for us or your provider?   Have you scheduled the pediatrician appointment with pediatrician?   Do you have a postpartum visit scheduled? 25      Call was disconnected during postpartum assessment. Placed call to patient, left message on voicemail requesting return call to office.  "

## 2025-01-02 NOTE — PROGRESS NOTES
Natalya is a 38 yo  at 38w1d presenting for routine PNC- she reports occasional contractions that dissipate with rest and baths- denies any LOF or VB. She endorses good FM.   Cervical exam today is closed/ -3, soft.  She is hopeful for TOLAC with spontaneous labor but has a scheduled  delivery 12/3/2024 in the event she does not go into labor on her own.   Desires bilateral salpingectomy at her  delivery if does not  and is aware of it's permanent and irreversible nature.   Labor precautions reviewed- RTO in 1 week.      Detail Level: Simple Render Risk Assessment In Note?: no Comment: Superficial crust and granulation tissue removed easily from anterior plaque prior to biopsy. No distinctive features of bcc ulcer surrounding crust but bx taken to r.o. If negative, continue tacrolimus plus keratolytic.

## 2025-01-07 ENCOUNTER — POSTPARTUM VISIT (OUTPATIENT)
Dept: OBGYN CLINIC | Facility: CLINIC | Age: 40
End: 2025-01-07

## 2025-01-07 VITALS
BODY MASS INDEX: 30.49 KG/M2 | HEIGHT: 65 IN | SYSTOLIC BLOOD PRESSURE: 120 MMHG | WEIGHT: 183 LBS | DIASTOLIC BLOOD PRESSURE: 86 MMHG

## 2025-01-07 DIAGNOSIS — O34.219 VBAC (VAGINAL BIRTH AFTER CESAREAN): ICD-10-CM

## 2025-01-07 PROCEDURE — 99024 POSTOP FOLLOW-UP VISIT: CPT | Performed by: STUDENT IN AN ORGANIZED HEALTH CARE EDUCATION/TRAINING PROGRAM

## 2025-01-07 NOTE — PROGRESS NOTES
Postpartum Visit  MD Keyur    25      Subjective     Natalya Mercado is a 39 y.o.  female who presents for a postpartum visit.       Term 24 39w0d / 0h 38m 3690 g (8 lb 2.2 oz) M Vag-Spont Epidural N Living 8 9    Name: Rell Mercado   [facility]   Delivering Clinician: Divya Baer MD        Spotting intermittent  Laceration: none  Patient has not been sexually active.   Desired contraception method is condoms    Postpartum Depression: Low Risk  (2025)    [place]  Depression Scale     Last EPDS Total Score: 1     Last EPDS Self Harm Result: Never         Gestational Diabetes: no  Gestational HTN/Preeclampsia: no  Pregnancy Complications: none          Current Outpatient Medications:     Cholecalciferol 125 MCG (5000 UT) capsule, Take 5,000 Units by mouth daily, Disp: , Rfl:     Ferrous Sulfate (Iron) 325 (65 Fe) MG TABS, Take by mouth, Disp: , Rfl:     ibuprofen (MOTRIN) 600 mg tablet, Take 1 tablet (600 mg total) by mouth every 6 (six) hours as needed for mild pain, Disp: 30 tablet, Rfl: 0    Prenatal MV & Min w/FA-DHA (PRENATAL GUMMIES PO), , Disp: , Rfl:     acetaminophen (TYLENOL) 325 mg tablet, Take 3 tablets (975 mg total) by mouth every 6 (six) hours as needed for mild pain or moderate pain (Patient not taking: Reported on 2025), Disp: 30 tablet, Rfl: 0    benzocaine-menthol-lanolin-aloe (DERMOPLAST) 20-0.5 % topical spray, Apply 1 Application topically every 6 (six) hours as needed for irritation (Patient not taking: Reported on 2025), Disp: , Rfl:     hydrocortisone 1 % cream, Apply 1 Application topically daily as needed for irritation (Patient not taking: Reported on 2025), Disp: , Rfl:     witch hazel-glycerin (TUCKS) topical pad, Apply 1 Pad topically every 4 (four) hours as needed for irritation (Patient not taking: Reported on 2025), Disp: , Rfl:     Allergies   Allergen Reactions  "   Penicillins Anaphylaxis    Iodine - Food Allergy GI Intolerance     vomiting       Review of Systems  Per HPI    Objective    /86 (BP Location: Left arm, Patient Position: Sitting, Cuff Size: Standard)   Ht 5' 5\" (1.651 m)   Wt 83 kg (183 lb)   LMP 2024 (Exact Date)   Breastfeeding Yes   BMI 30.45 kg/m²   Physical Exam  HENT:      Head: Normocephalic.   Eyes:      Extraocular Movements: Extraocular movements intact.      Conjunctiva/sclera: Conjunctivae normal.   Pulmonary:      Effort: Pulmonary effort is normal.   Abdominal:      Palpations: Abdomen is soft.   Genitourinary:     Comments: Vulva: normal, no lesions  Vagina: normal, no lesions or ttp  Urethra: normal, no lesions, masses or ttp  Bladder: normal, no masses or ttp  Cervix: normal, no lesions, masses or CMT  Uterus: normal-size, normal mobility  Adnexa: no masses or ttp  Musculoskeletal:         General: Normal range of motion.      Cervical back: Normal range of motion.   Skin:     General: Skin is warm and dry.   Neurological:      General: No focal deficit present.      Mental Status: She is alert.   Psychiatric:         Mood and Affect: Mood normal.         Behavior: Behavior normal.         Thought Content: Thought content normal.           Assessment/Plan:  Natalya Mercado is a 39 y.o. who is postpartum from an  with a normal postpartum examination.    1. Contraception: condoms  2. Annual exam due in 3-6 months; Last Pap: 2024 NILM   3. Increase activity as tolerated, may resume all normal activity at 6 weeks.    "

## 2025-01-28 ENCOUNTER — NURSE TRIAGE (OUTPATIENT)
Age: 40
End: 2025-01-28

## 2025-01-28 DIAGNOSIS — B37.9 YEAST INFECTION: Primary | ICD-10-CM

## 2025-01-28 RX ORDER — FLUCONAZOLE 150 MG/1
150 TABLET ORAL DAILY
Qty: 1 TABLET | Refills: 0 | Status: SHIPPED | OUTPATIENT
Start: 2025-01-28 | End: 2025-01-29

## 2025-01-28 NOTE — TELEPHONE ENCOUNTER
"Patient calling back in.  Confirmed previous note with patient.  Denies chance of pregnancy, has not got period back yet, delivered 11/28/24.  Pt advised this is a typical yeast infection for her.  Allergies and pharmacy reviewed.  She was advised diflucan will be sent to the pharmacy for her. Advised to call back for an appt if symptoms do not resolve.  Pt verbalized understanding, no further questions or concerns at this time.     \"How many yeast infections have you had in the past 2 years?\"    -If 1 or less, prescribe Diflucan 150mg PO. If no improvement after 1 dose treatment, please instruct patient to call back to schedule appointment. (should be seen within 3 days)    -If more than 4 or more yeast infections in a year or if they are recurrent, schedule appointment within 3 days.    For OB patients: if patient wishes to use over the counter monistat, recommend only the 7 day treatment.       Reason for Disposition   Symptoms of a vaginal yeast infection (i.e., white, thick, cottage-cheese-like, itchy, not bad smelling discharge)    Protocols used: Vaginal Discharge-Adult-[place]    "
"Pt called in reporting possible yeast infection- thick white discharge, mild itching, sweet smell that started 2-3 days ago. She denies any pain, irritation, burning, rash. States her postpartum bleeding just stopped, delivered 11/28/24.    Call then disconnected, attempted call back and left non detailed message for pt.     Answer Assessment - Initial Assessment Questions  1. DISCHARGE: \"Describe the discharge.\" (e.g., white, yellow, green, gray, foamy, cottage cheese-like)      Thick white  2. ODOR: \"Is there a bad odor?\"      Sweet smell  3. ONSET: \"When did the discharge begin?\"      2-3 days ago  4. RASH: \"Is there a rash in the genital area?\" If Yes, ask: \"Describe it.\" (e.g., redness, blisters, sores, bumps)      denies  5. ABDOMEN PAIN: \"Are you having any abdomen pain?\" If Yes, ask: \"What does it feel like? \" (e.g., crampy, dull, intermittent, constant)       denies  6. ABDOMEN PAIN SEVERITY: If present, ask: \"How bad is it?\" (e.g., Scale 1-10; mild, moderate, or severe)      denies  7. CAUSE: \"What do you think is causing the discharge?\" \"Have you had the same problem before?\" \"What happened then?\"      Yeast infection  8. OTHER SYMPTOMS: \"Do you have any other symptoms?\" (e.g., fever, itching, vaginal bleeding, pain with urination, injury to genital area, vaginal foreign body)      denies  9. PREGNANCY: \"Is there any chance you are pregnant?\" \"When was your last menstrual period?\"      *call disconnected before asking question    Protocols used: Vaginal Discharge-Adult-[place]    "
I have personally seen and examined this patient.  I have reviewed all pertinent clinical information and reviewed all relevant imaging and diagnostic studies personally.   If possible, I counseled the patient about diagnostic testing and treatment plan.   I discussed my recommendations with the primary team.

## 2025-01-29 ENCOUNTER — OFFICE VISIT (OUTPATIENT)
Dept: POSTPARTUM | Facility: CLINIC | Age: 40
End: 2025-01-29
Payer: COMMERCIAL

## 2025-01-29 PROCEDURE — 99404 PREV MED CNSL INDIV APPRX 60: CPT | Performed by: PEDIATRICS

## 2025-01-29 NOTE — PROGRESS NOTES
INITIAL BREAST FEEDING EVALUATION    Informant/Relationship: Natalya (mom/self)     Discussion of General Lactation Issues: Natalya is here because her first child had a lip tie and posterior tongue tie. That baby also had a lot of clicking. She is starting to see these trends with Rell. He is content and growing well overall, but is fussy and uncomfortable right after nursing. She is reporting green or yellow and almost always mucous in his poops. 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 Mom feels the latch doesn't seem great, he latches better on the right than on the left. He is super fussy on the left side.     She was pumping once during his nap, but was starting to have clogs and engorgement, so she stopped this. She did block feedings for 2 days - this seemed to help a little bit.     Infant is 8 weeks 6 days old today.        History:  Fertility Problem:no  Breast changes:yes - enlargement   : yes - she was in labor for about 23 hours, pushed for 31 minutes   Full term:yes - 39 weeks    labor:no  First nursing/attempt < 1 hour after birth:yes - Mom reports this was a sleepy/uncoordinated latch   Skin to skin following delivery:yes - immediately   Breast changes after delivery:yes - 4 days postpartum   Rooming in (infant in room with mother with exception of procedures, eg. Circumcision: yes - entire stay   Blood sugar issues:no  NICU stay:no  Jaundice:no  Phototherapy:no  Supplement given: (list supplement and method used as well as reason(s):no    Past Medical History:   Diagnosis Date    Abnormal Pap smear of cervix     teens, and colpo with DR Toth    Anemia     during pregnancy    Depression     Hypertension     preeclampsia severe    Migraine     Varicella     as a child         Current Outpatient Medications:     acetaminophen (TYLENOL) 325 mg tablet, Take 3 tablets (975 mg total) by mouth every 6 (six) hours as needed for mild pain or moderate pain (Patient not taking:  Reported on 1/7/2025), Disp: 30 tablet, Rfl: 0    benzocaine-menthol-lanolin-aloe (DERMOPLAST) 20-0.5 % topical spray, Apply 1 Application topically every 6 (six) hours as needed for irritation (Patient not taking: Reported on 1/7/2025), Disp: , Rfl:     Cholecalciferol 125 MCG (5000 UT) capsule, Take 5,000 Units by mouth daily, Disp: , Rfl:     Ferrous Sulfate (Iron) 325 (65 Fe) MG TABS, Take by mouth, Disp: , Rfl:     fluconazole (DIFLUCAN) 150 mg tablet, Take 1 tablet (150 mg total) by mouth daily for 1 dose, Disp: 1 tablet, Rfl: 0    hydrocortisone 1 % cream, Apply 1 Application topically daily as needed for irritation (Patient not taking: Reported on 1/7/2025), Disp: , Rfl:     ibuprofen (MOTRIN) 600 mg tablet, Take 1 tablet (600 mg total) by mouth every 6 (six) hours as needed for mild pain, Disp: 30 tablet, Rfl: 0    Prenatal MV & Min w/FA-DHA (PRENATAL GUMMIES PO), , Disp: , Rfl:     witch hazel-glycerin (TUCKS) topical pad, Apply 1 Pad topically every 4 (four) hours as needed for irritation (Patient not taking: Reported on 1/7/2025), Disp: , Rfl:     Allergies   Allergen Reactions    Penicillins Anaphylaxis    Iodine - Food Allergy GI Intolerance     vomiting       Social History     Substance and Sexual Activity   Drug Use Not Currently    Types: Marijuana       Social History     Interval Breastfeeding History:    Frequency of breast feeding: every 2 hours during the day, 4-5 stretch overnight   Does mother feel breastfeeding is effective: Yes  Does infant appear satisfied after nursing:Yes  Stooling pattern normal: Yes, stooling after each feeding typically, always mucous in the stool, sometimes yellow and sometimes green   Urinating frequently:Yes  Using shield or shells: No    Alternative/Artificial Feedings:   Bottle: Yes, offered just for practice, didn't latch well to it. [place] Tippie natural start   Cup: No  Syringe/Finger: No           Formula Type: none                      Amount: n/a             Breast Milk:                      Amount: only directly from the breast           Elimination Problems: No      Equipment:    Pump            Type: Lansinoh Discreet Duo             Frequency of Use: not using regularly       Equipment Problems: no    Mom:  Breast: Normal  Nipple Assessment in General: Normal: elongated/eraser, no discoloration and no damage noted.  Mother's Awareness of Feeding Cues                 Recognizes: Yes                  Verbalizes: Yes  Support System: good support   History of Breastfeeding: nursed 18 mo, older daughter Fernando had dx tongue and lie ties that were dx but not corrected. She had large spits.   Changes/Stressors/Violence: baby has reflux, green mucous stools, doesn't nurse comfortably on the left side.   Concerns/Goals: Natalya desires to nurse for as long as possible.     Problems with Mom: hyperlactation     Physical Exam  Constitutional:       Appearance: Normal appearance.   HENT:      Head: Normocephalic.   Pulmonary:      Effort: Pulmonary effort is normal.   Musculoskeletal:         General: Normal range of motion.      Cervical back: Normal range of motion.   Neurological:      General: No focal deficit present.      Mental Status: She is alert and oriented to person, place, and time.   Skin:     General: Skin is warm.      Capillary Refill: Capillary refill takes less than 2 seconds.   Psychiatric:         Mood and Affect: Mood normal.         Behavior: Behavior normal.         Thought Content: Thought content normal.         Judgment: Judgment normal.         Infant:  Behaviors: Alert  Color: Pink  Birth weight: 3690 g   Current weight: 6245 g     Problems with infant: none today       General Appearance:  Alert, active, no distress                             Head:  Normocephalic, AFOF, sutures opposed                             Eyes:  Conjunctiva clear, no drainage                              Ears:  Normally placed, no anomolies                             Nose:   Septum intact, no drainage or erythema                           Mouth:  No lesions. Tongue is at rest at the roof of his mouth, extends past lip, lateralizes well. Takes a few seconds for him to get an organized suck on my finger, but when he does he cups the finger fully and eventually is able to establish a peristaltic sucking rhythm.                     Neck:  Supple, slight increased tension noted when turning towards the left shoulder. Symmetrical, trachea midline             Cardiovascular:  Regular rate and rhythm. No murmur. Adequate perfusion/capillary refill. Femoral pulse present                    Abdomen:   Soft, non-tender, no masses, bowel sounds present, no HSM             Genitourinary:  Normal male, testes descended, no discharge, swelling, or pain, anus patent                          Spine:   No abnormalities noted        Musculoskeletal:  Full range of motion          Skin/Hair/Nails:   Skin warm, dry, and intact, no rashes or abnormal dyspigmentation or lesions                Neurologic:   No abnormal movement, tone appropriate for gestational age    [place] Latch:  Efficiency:               Lips Flanged: Yes - adjusted a few times to flange lips slightly               Depth of latch: wide               Audible Swallow: Yes              Visible Milk: Yes              Wide Open/ Asymmetrical: Yes              Suck Swallow Cycle: Breathing: yes, Coordinated: yes  Nipple Assessment after latch: Normal: elongated/eraser, no discoloration and no damage noted.  Latch Problems: None. He is gulping for most of the feeding but tolerated it well. No signs of distress or poor milk transfer .     Weighted feeding : baby is +115 g after nursing on both breasts.     Position:  Infant's Ergonomics/Body               Body Alignment: Yes               Head Supported: Yes               Close to Mom's body/ Lifted/ Supported: Yes               Mom's Ergonomics/Body: Yes                           Supported: Yes                            Sitting Back: Yes                           Brings Baby to her breast: Yes  Positioning Problems: Good alignment, Reviewed BN hold and Mom returns this demonstration well. He appears comfortable both on the right and left breasts today.     Education:  Reviewed Latch: importance of deep latch without pain.   Reviewed Positioning for Dyad: proper alignment and head angle when positioning at the breast   Reviewed Frequency/Supply & Demand: offer the breast at each feeding, pump if baby is not latching and effective transferring milk.   Reviewed Infant:Cues and varied States of Awareness: watch for hunger cues, feed on demand. If baby seems satisfied at the breast (calm, relaxed sleeping, breasts are softer) no need to pump or supplement   Reviewed Infant Elimination: goal of 6+ wets and 2-3 stools per day   Reviewed Alternative/Artificial Feedings: paced bottle feeding technique demonstrated  Reviewed Mom/Breast care: gentle handling of the breast at all times, as well as tips for healing sore nipples.    Reviewed Equipment: Hand pump and electric pump general guidance, Discussed proper flange fit, measured        Plan:      Reassurance provided that baby is growing well at this time. Cont with good positioning technique and watch for signs of effective feeding. Pump only if wanting to replace feeding at the breast with bottle feeding. Okay to do block feeding for 1-2 days as needed to help regulate production. Cold compresses and ibuprofen discussed for engorgement relief. Gentle handling of the breast at all times to preserve integrity.  Contact [facility] for breastfeeding support as needed or ongoing concerns with latching comfort and milk transfer.      I have spent 60 minutes with Patient and family today in which greater than 50% of this time was spent in counseling/coordination of care regarding Patient and family education.

## 2025-01-29 NOTE — PATIENT INSTRUCTIONS
"-Great meeting with you and your sweet baby today! He is growing well and appears to be transferring milk well!   -Continue to feed Rell on demand. No need to pump if he is feeding well and making your breasts feed more comfortable throughout the day.   -Okay to save small \"bonus\" volumes daily if desired. I typically recommend 1-3 oz per day max to prevent risk associated with an oversupply.   -Block feeding was a good technique to help down regulate your production, Block Feeding Dos & Don'ts - Sheila Guerrero . If you feel this has already helped, you can continue to feed as you have been. If you'd like to do block feeding for another day or two that is acceptable as well. As you know, it is recommended to limit this intervention to only a few days to avoid drastically reducing production.   -Nipples measured today at 14 mm on each side. 15-17 mm may work best for you. Generic brand inserts are available on [employer]. Goal should always be comfortable pumping, with little breast tunneling of breast tissue behind the nipple, and milk should be spraying from the nipple throughout most of the session.  [email or web address]   -it is likely not necessary to stick with any diet restrictions unless you are feeling they in and of themselves are what are helping Rell's symptoms.   -Call for questions or to schedule a follow up visit at any time.   "

## 2025-02-02 NOTE — PROGRESS NOTES
I have reviewed the notes, assessments, and/or procedures performed by Bee Ledesma RN, IBCLC, I concur with her/his documentation of Natalya Shi MD 02/01/25